# Patient Record
Sex: FEMALE | Race: WHITE | NOT HISPANIC OR LATINO | Employment: OTHER | URBAN - METROPOLITAN AREA
[De-identification: names, ages, dates, MRNs, and addresses within clinical notes are randomized per-mention and may not be internally consistent; named-entity substitution may affect disease eponyms.]

---

## 2023-04-28 LAB
C REACTIVE PROTEIN (MG/L) IN SER/PLAS: 0.13 MG/DL
SEDIMENTATION RATE, ERYTHROCYTE: 14 MM/H (ref 0–30)

## 2023-05-08 DIAGNOSIS — E78.5 HYPERLIPIDEMIA, UNSPECIFIED: ICD-10-CM

## 2023-05-08 DIAGNOSIS — K21.9 GASTRO-ESOPHAGEAL REFLUX DISEASE WITHOUT ESOPHAGITIS: ICD-10-CM

## 2023-05-09 RX ORDER — PANTOPRAZOLE SODIUM 40 MG/1
TABLET, DELAYED RELEASE ORAL
Qty: 30 TABLET | Refills: 0 | Status: SHIPPED | OUTPATIENT
Start: 2023-05-09 | End: 2023-05-16 | Stop reason: SDUPTHER

## 2023-05-09 RX ORDER — SIMVASTATIN 40 MG/1
TABLET, FILM COATED ORAL
Qty: 30 TABLET | Refills: 0 | Status: SHIPPED | OUTPATIENT
Start: 2023-05-09 | End: 2023-05-16 | Stop reason: SDUPTHER

## 2023-05-09 RX ORDER — EZETIMIBE 10 MG/1
TABLET ORAL
Qty: 30 TABLET | Refills: 0 | Status: SHIPPED | OUTPATIENT
Start: 2023-05-09 | End: 2023-05-16 | Stop reason: SDUPTHER

## 2023-05-16 ENCOUNTER — OFFICE VISIT (OUTPATIENT)
Dept: PRIMARY CARE | Facility: CLINIC | Age: 73
End: 2023-05-16
Payer: MEDICARE

## 2023-05-16 VITALS
RESPIRATION RATE: 16 BRPM | HEIGHT: 66 IN | BODY MASS INDEX: 31.34 KG/M2 | WEIGHT: 195 LBS | DIASTOLIC BLOOD PRESSURE: 86 MMHG | SYSTOLIC BLOOD PRESSURE: 130 MMHG | OXYGEN SATURATION: 100 % | HEART RATE: 64 BPM

## 2023-05-16 DIAGNOSIS — K22.719 BARRETT'S ESOPHAGUS WITH DYSPLASIA: ICD-10-CM

## 2023-05-16 DIAGNOSIS — M25.551 RIGHT HIP PAIN: ICD-10-CM

## 2023-05-16 DIAGNOSIS — M54.41 CHRONIC BILATERAL LOW BACK PAIN WITH RIGHT-SIDED SCIATICA: ICD-10-CM

## 2023-05-16 DIAGNOSIS — F33.41 RECURRENT MAJOR DEPRESSIVE DISORDER, IN PARTIAL REMISSION (CMS-HCC): ICD-10-CM

## 2023-05-16 DIAGNOSIS — M54.41 CHRONIC BILATERAL LOW BACK PAIN WITH RIGHT-SIDED SCIATICA: Primary | ICD-10-CM

## 2023-05-16 DIAGNOSIS — N32.81 OAB (OVERACTIVE BLADDER): ICD-10-CM

## 2023-05-16 DIAGNOSIS — K21.9 CHRONIC GERD: ICD-10-CM

## 2023-05-16 DIAGNOSIS — G89.29 CHRONIC BILATERAL LOW BACK PAIN WITH RIGHT-SIDED SCIATICA: ICD-10-CM

## 2023-05-16 DIAGNOSIS — E66.09 CLASS 1 OBESITY DUE TO EXCESS CALORIES WITHOUT SERIOUS COMORBIDITY WITH BODY MASS INDEX (BMI) OF 31.0 TO 31.9 IN ADULT: ICD-10-CM

## 2023-05-16 DIAGNOSIS — I51.9 DIASTOLIC DYSFUNCTION, LEFT VENTRICLE: Primary | ICD-10-CM

## 2023-05-16 DIAGNOSIS — E78.5 DYSLIPIDEMIA: ICD-10-CM

## 2023-05-16 DIAGNOSIS — K21.9 GASTRO-ESOPHAGEAL REFLUX DISEASE WITHOUT ESOPHAGITIS: ICD-10-CM

## 2023-05-16 DIAGNOSIS — E78.5 HYPERLIPIDEMIA, UNSPECIFIED: ICD-10-CM

## 2023-05-16 DIAGNOSIS — Z86.718 HX OF DEEP VENOUS THROMBOSIS: ICD-10-CM

## 2023-05-16 DIAGNOSIS — G89.29 CHRONIC BILATERAL LOW BACK PAIN WITH RIGHT-SIDED SCIATICA: Primary | ICD-10-CM

## 2023-05-16 PROBLEM — H02.831 DERMATOCHALASIS OF RIGHT UPPER EYELID: Status: ACTIVE | Noted: 2023-05-16

## 2023-05-16 PROBLEM — K44.9 HIATAL HERNIA: Status: ACTIVE | Noted: 2023-05-16

## 2023-05-16 PROBLEM — N95.0 PMB (POSTMENOPAUSAL BLEEDING): Status: ACTIVE | Noted: 2023-05-16

## 2023-05-16 PROBLEM — G47.00 INSOMNIA: Status: ACTIVE | Noted: 2023-05-16

## 2023-05-16 PROBLEM — H57.813 BROW PTOSIS, BILATERAL: Status: ACTIVE | Noted: 2023-05-16

## 2023-05-16 PROBLEM — H52.4 ASTIGMATISM OF BOTH EYES WITH PRESBYOPIA: Status: ACTIVE | Noted: 2023-05-16

## 2023-05-16 PROBLEM — I83.10 VARICOSE VEINS WITH INFLAMMATION: Status: ACTIVE | Noted: 2023-05-16

## 2023-05-16 PROBLEM — H25.813 COMBINED FORM OF AGE-RELATED CATARACT, BOTH EYES: Status: ACTIVE | Noted: 2023-05-16

## 2023-05-16 PROBLEM — H25.13 CATARACT, NUCLEAR SCLEROTIC, BOTH EYES: Status: ACTIVE | Noted: 2023-05-16

## 2023-05-16 PROBLEM — R92.8 ABNORMAL MAMMOGRAM: Status: ACTIVE | Noted: 2023-05-16

## 2023-05-16 PROBLEM — H02.403 PTOSIS OF BOTH EYELIDS: Status: ACTIVE | Noted: 2023-05-16

## 2023-05-16 PROBLEM — H02.839 DERMATOCHALASIS: Status: ACTIVE | Noted: 2023-05-16

## 2023-05-16 PROBLEM — R73.03 PREDIABETES: Status: RESOLVED | Noted: 2023-05-16 | Resolved: 2023-05-16

## 2023-05-16 PROBLEM — R10.31 RIGHT INGUINAL PAIN: Status: ACTIVE | Noted: 2023-05-16

## 2023-05-16 PROBLEM — H53.40 VISUAL FIELD DEFECT: Status: ACTIVE | Noted: 2023-05-16

## 2023-05-16 PROBLEM — M17.9 OSTEOARTHRITIS OF KNEE: Status: ACTIVE | Noted: 2023-05-16

## 2023-05-16 PROBLEM — N18.31 STAGE 3A CHRONIC KIDNEY DISEASE (MULTI): Status: ACTIVE | Noted: 2023-05-16

## 2023-05-16 PROBLEM — N18.31 STAGE 3A CHRONIC KIDNEY DISEASE (MULTI): Status: RESOLVED | Noted: 2023-05-16 | Resolved: 2023-05-16

## 2023-05-16 PROBLEM — K12.2 UVULITIS: Status: ACTIVE | Noted: 2023-05-16

## 2023-05-16 PROBLEM — H02.839 DERMATOCHALASIS: Status: RESOLVED | Noted: 2023-05-16 | Resolved: 2023-05-16

## 2023-05-16 PROBLEM — E66.9 OBESITY (BMI 30-39.9): Status: ACTIVE | Noted: 2023-05-16

## 2023-05-16 PROBLEM — H02.834 DERMATOCHALASIS OF LEFT UPPER EYELID: Status: ACTIVE | Noted: 2023-05-16

## 2023-05-16 PROBLEM — I82.409 DEEP VEIN THROMBOSIS (MULTI): Status: ACTIVE | Noted: 2023-05-16

## 2023-05-16 PROBLEM — M54.31 SCIATICA OF RIGHT SIDE: Status: ACTIVE | Noted: 2023-05-16

## 2023-05-16 PROBLEM — R06.00 DYSPNEA: Status: ACTIVE | Noted: 2023-05-16

## 2023-05-16 PROBLEM — S76.019A STRAIN OF HIP FLEXOR: Status: ACTIVE | Noted: 2023-05-16

## 2023-05-16 PROBLEM — J02.9 SORE THROAT: Status: ACTIVE | Noted: 2023-05-16

## 2023-05-16 PROBLEM — J31.0 RHINITIS: Status: ACTIVE | Noted: 2023-05-16

## 2023-05-16 PROBLEM — M54.50 LOW BACK PAIN: Status: ACTIVE | Noted: 2023-05-16

## 2023-05-16 PROBLEM — H25.12 AGE-RELATED NUCLEAR CATARACT, LEFT: Status: ACTIVE | Noted: 2023-05-16

## 2023-05-16 PROBLEM — J02.9 SORE THROAT: Status: RESOLVED | Noted: 2023-05-16 | Resolved: 2023-05-16

## 2023-05-16 PROBLEM — N39.0 ACUTE UTI: Status: RESOLVED | Noted: 2023-05-16 | Resolved: 2023-05-16

## 2023-05-16 PROBLEM — H52.203 ASTIGMATISM OF BOTH EYES WITH PRESBYOPIA: Status: ACTIVE | Noted: 2023-05-16

## 2023-05-16 PROBLEM — S76.019A STRAIN OF HIP FLEXOR: Status: RESOLVED | Noted: 2023-05-16 | Resolved: 2023-05-16

## 2023-05-16 PROBLEM — R10.13 DYSPEPSIA: Status: ACTIVE | Noted: 2023-05-16

## 2023-05-16 PROBLEM — N39.0 ACUTE UTI: Status: ACTIVE | Noted: 2023-05-16

## 2023-05-16 PROBLEM — M25.562 BILATERAL KNEE PAIN: Status: ACTIVE | Noted: 2023-05-16

## 2023-05-16 PROBLEM — H02.89 STEATOBLEPHARON: Status: ACTIVE | Noted: 2023-05-16

## 2023-05-16 PROBLEM — R73.03 PREDIABETES: Status: ACTIVE | Noted: 2023-05-16

## 2023-05-16 PROBLEM — M25.561 BILATERAL KNEE PAIN: Status: ACTIVE | Noted: 2023-05-16

## 2023-05-16 PROBLEM — K22.70 BARRETT'S ESOPHAGUS: Status: ACTIVE | Noted: 2023-05-16

## 2023-05-16 PROCEDURE — 99214 OFFICE O/P EST MOD 30 MIN: CPT | Performed by: FAMILY MEDICINE

## 2023-05-16 PROCEDURE — 1036F TOBACCO NON-USER: CPT | Performed by: FAMILY MEDICINE

## 2023-05-16 PROCEDURE — 1159F MED LIST DOCD IN RCRD: CPT | Performed by: FAMILY MEDICINE

## 2023-05-16 PROCEDURE — 3008F BODY MASS INDEX DOCD: CPT | Performed by: FAMILY MEDICINE

## 2023-05-16 RX ORDER — ENOXAPARIN SODIUM 100 MG/ML
INJECTION SUBCUTANEOUS
COMMUNITY
Start: 2022-11-17 | End: 2023-05-16 | Stop reason: ALTCHOICE

## 2023-05-16 RX ORDER — ENOXAPARIN SODIUM 100 MG/ML
INJECTION SUBCUTANEOUS
COMMUNITY
Start: 2022-11-10 | End: 2023-05-16 | Stop reason: ALTCHOICE

## 2023-05-16 RX ORDER — OXYBUTYNIN CHLORIDE 10 MG/1
10 TABLET, EXTENDED RELEASE ORAL DAILY
Qty: 90 TABLET | Refills: 1 | Status: SHIPPED | OUTPATIENT
Start: 2023-05-16 | End: 2023-11-14 | Stop reason: SDUPTHER

## 2023-05-16 RX ORDER — APIXABAN 5 MG/1
5 TABLET, FILM COATED ORAL 2 TIMES DAILY
Qty: 180 TABLET | Refills: 1 | Status: SHIPPED | OUTPATIENT
Start: 2023-05-16 | End: 2023-06-12

## 2023-05-16 RX ORDER — BUPROPION HYDROCHLORIDE 150 MG/1
150 TABLET ORAL DAILY
Qty: 90 TABLET | Refills: 1 | Status: SHIPPED | OUTPATIENT
Start: 2023-05-16 | End: 2023-06-12

## 2023-05-16 RX ORDER — APIXABAN 5 MG/1
TABLET, FILM COATED ORAL
COMMUNITY
Start: 2022-11-18 | End: 2023-05-16 | Stop reason: SDUPTHER

## 2023-05-16 RX ORDER — PANTOPRAZOLE SODIUM 40 MG/1
40 TABLET, DELAYED RELEASE ORAL DAILY PRN
Qty: 90 TABLET | Refills: 1 | Status: SHIPPED | OUTPATIENT
Start: 2023-05-16 | End: 2023-11-14 | Stop reason: SDUPTHER

## 2023-05-16 RX ORDER — BACLOFEN 10 MG/1
1 TABLET ORAL 3 TIMES DAILY PRN
COMMUNITY
Start: 2021-08-23 | End: 2023-05-16 | Stop reason: SDUPTHER

## 2023-05-16 RX ORDER — EZETIMIBE 10 MG/1
10 TABLET ORAL DAILY
Qty: 90 TABLET | Refills: 1 | Status: SHIPPED | OUTPATIENT
Start: 2023-05-16 | End: 2023-11-14 | Stop reason: SDUPTHER

## 2023-05-16 RX ORDER — ACETAMINOPHEN 325 MG/1
TABLET ORAL
COMMUNITY
Start: 2022-11-17

## 2023-05-16 RX ORDER — BACLOFEN 10 MG/1
10 TABLET ORAL 3 TIMES DAILY PRN
Qty: 60 TABLET | Refills: 2 | Status: SHIPPED | OUTPATIENT
Start: 2023-05-16 | End: 2023-10-30 | Stop reason: ALTCHOICE

## 2023-05-16 RX ORDER — OXYBUTYNIN CHLORIDE 10 MG/1
1 TABLET, EXTENDED RELEASE ORAL DAILY
COMMUNITY
Start: 2016-01-15 | End: 2023-05-16 | Stop reason: SDUPTHER

## 2023-05-16 RX ORDER — BUPROPION HYDROCHLORIDE 150 MG/1
1 TABLET ORAL DAILY
COMMUNITY
Start: 2017-04-03 | End: 2023-05-16 | Stop reason: SDUPTHER

## 2023-05-16 RX ORDER — SIMVASTATIN 40 MG/1
40 TABLET, FILM COATED ORAL NIGHTLY
Qty: 90 TABLET | Refills: 1 | Status: SHIPPED | OUTPATIENT
Start: 2023-05-16 | End: 2023-11-14 | Stop reason: SDUPTHER

## 2023-05-16 RX ORDER — HYDROXYZINE HYDROCHLORIDE 50 MG/1
TABLET, FILM COATED ORAL
COMMUNITY
Start: 2022-11-29 | End: 2023-10-30 | Stop reason: ALTCHOICE

## 2023-05-16 ASSESSMENT — ENCOUNTER SYMPTOMS
HEADACHES: 0
SINUS PRESSURE: 0
FATIGUE: 0
LIGHT-HEADEDNESS: 0
NAUSEA: 0
WHEEZING: 0
CHILLS: 0
DEPRESSION: 0
FEVER: 0
CHEST TIGHTNESS: 0
SHORTNESS OF BREATH: 0
DYSURIA: 0
LOSS OF SENSATION IN FEET: 0
ARTHRALGIAS: 1
DIARRHEA: 0
ABDOMINAL DISTENTION: 0
DIFFICULTY URINATING: 0
BRUISES/BLEEDS EASILY: 0
APPETITE CHANGE: 0
ADENOPATHY: 0
ABDOMINAL PAIN: 0
BACK PAIN: 1
DYSPHORIC MOOD: 0
OCCASIONAL FEELINGS OF UNSTEADINESS: 1
SLEEP DISTURBANCE: 0
MYALGIAS: 0
NERVOUS/ANXIOUS: 0

## 2023-05-16 NOTE — PROGRESS NOTES
"Subjective   Patient ID: Zara Motta is a 72 y.o. female who presents for Follow-up.    HPI     Review of Systems    Objective   /86   Pulse 64   Resp 16   Ht 1.676 m (5' 6\")   Wt 88.5 kg (195 lb)   SpO2 100%   BMI 31.47 kg/m²     Physical Exam    Assessment/Plan          "

## 2023-05-16 NOTE — PROGRESS NOTES
Subjective   Patient ID: Zara Motta is a 72 y.o. female who presents for Follow-up.  Seeing pain mngt  and to have MRI for chronic R hip, post MINERVA in November, and chronic lower back pain. It radiates down R inner thigh. PT hasn't helped. Also had spinal injx about 2 months ago. Taking Tylenol and Baclofen as needed.    Pt has Dyslipidemia.   Lipid panel showed LDL in good range..  Currently taking Zetia, and Simvastatin  and is tolerating well without muscle pains or weakness.     For OAB  taking Oxybutynin . Doing well and tolerating medication well. Frequency is not bothersome.     Pt has chronic and stable depression / anxiety.  Pt has supportive family/friends.   Pt not seeing a counselor.   Taking Bupropion and it is helping.   Mood, energy, motivation, interests, sleep and appetite are adequate. Anxiety is stable.  Pt denies suicidal ideations.     GERD/Barretts  is well controlled on Pantoprazole . EGD current from 2021. Pt is taking medication once daily. Denies epigastric pain, nausea, heartburn or water brash.     For hx DVT doing well on Eliquis, changed from Warfarin back in November after hip replacement.         Review of Systems   Constitutional:  Negative for appetite change, chills, fatigue and fever.   HENT:  Negative for congestion, ear pain and sinus pressure.    Eyes:  Negative for visual disturbance.   Respiratory:  Negative for chest tightness, shortness of breath and wheezing.    Cardiovascular:  Negative for chest pain.   Gastrointestinal:  Negative for abdominal distention, abdominal pain, diarrhea and nausea.   Genitourinary:  Negative for difficulty urinating, dysuria and pelvic pain.   Musculoskeletal:  Positive for arthralgias and back pain. Negative for myalgias.   Skin:  Negative for rash.   Allergic/Immunologic: Negative for immunocompromised state.   Neurological:  Negative for light-headedness and headaches.   Hematological:  Negative for adenopathy. Does not bruise/bleed  "easily.   Psychiatric/Behavioral:  Negative for dysphoric mood and sleep disturbance. The patient is not nervous/anxious.        Objective   /86   Pulse 64   Resp 16   Ht 1.676 m (5' 6\")   Wt 88.5 kg (195 lb)   SpO2 100%   BMI 31.47 kg/m²    Physical Exam  Constitutional:       General: She is not in acute distress.     Appearance: Normal appearance.   Cardiovascular:      Rate and Rhythm: Normal rate and regular rhythm.      Heart sounds: Normal heart sounds. No murmur heard.  Pulmonary:      Effort: Pulmonary effort is normal.      Breath sounds: Normal breath sounds.   Abdominal:      Palpations: Abdomen is soft.      Tenderness: There is no abdominal tenderness.   Neurological:      Mental Status: She is alert.   Psychiatric:         Mood and Affect: Mood normal.         Judgment: Judgment normal.           Assessment/Plan   Diagnoses and all orders for this visit:  Hyperlipidemia, unspecified - doing well on Zetia/statin, continue and monitor  Gastro-esophageal reflux disease / Barretts - doing well on Pantoprazole, EGD is current  OAB (overactive bladder) - stable with Oxybutynin, continue  Right hip pain / Back pain - continue with plan for MRI, per pain mgt/ortho  Recurrent major depressive disorder, in partial remission (CMS/HCC) - stable, continue Bupropion and monitor.  Weight - recommend low carb diet, increasing water intake to at least 64oz/day, healthy snacking between meals, and regular cardiovascular exercise 150mins/week. Goal for weight loss is 1-2# per week.   Follow up in 6 months, 30min       "

## 2023-06-01 ENCOUNTER — TELEPHONE (OUTPATIENT)
Dept: PRIMARY CARE | Facility: CLINIC | Age: 73
End: 2023-06-01

## 2023-06-01 NOTE — TELEPHONE ENCOUNTER
----- Message from Ha Damian MD sent at 6/1/2023  1:36 PM EDT -----  MRI result sent to me in error. Please make sure radiology sends the report to the ordering physician.  ----- Message -----  From: [facility] - Radiology Results In  Sent: 6/1/2023   9:51 AM EDT  To: Ha Damian MD

## 2023-06-01 NOTE — TELEPHONE ENCOUNTER
[facility] states there was no alert for the results. Gave me [phone] for [place] [facility]. [facility] to ensure results sent to correct ordering physician.

## 2023-06-10 DIAGNOSIS — F33.41 RECURRENT MAJOR DEPRESSIVE DISORDER, IN PARTIAL REMISSION (CMS-HCC): ICD-10-CM

## 2023-06-11 DIAGNOSIS — Z86.718 HX OF DEEP VENOUS THROMBOSIS: ICD-10-CM

## 2023-06-12 RX ORDER — BUPROPION HYDROCHLORIDE 150 MG/1
TABLET ORAL
Qty: 90 TABLET | Refills: 1 | Status: SHIPPED | OUTPATIENT
Start: 2023-06-12 | End: 2023-11-14 | Stop reason: SDUPTHER

## 2023-06-12 RX ORDER — APIXABAN 5 MG/1
TABLET, FILM COATED ORAL
Qty: 180 TABLET | Refills: 1 | Status: SHIPPED | OUTPATIENT
Start: 2023-06-12 | End: 2023-11-08 | Stop reason: SDUPTHER

## 2023-06-26 DIAGNOSIS — I82.409 ACUTE EMBOLISM AND THROMBOSIS OF UNSPECIFIED DEEP VEINS OF UNSPECIFIED LOWER EXTREMITY (MULTI): ICD-10-CM

## 2023-06-26 RX ORDER — WARFARIN 6 MG/1
TABLET ORAL
Qty: 90 TABLET | Refills: 1 | Status: SHIPPED | OUTPATIENT
Start: 2023-06-26 | End: 2023-08-29 | Stop reason: ALTCHOICE

## 2023-06-27 ENCOUNTER — TELEPHONE (OUTPATIENT)
Dept: PRIMARY CARE | Facility: CLINIC | Age: 73
End: 2023-06-27

## 2023-06-27 NOTE — TELEPHONE ENCOUNTER
----- Message from Ha Damian MD sent at 6/27/2023  1:00 PM EDT -----  Bone density test is normal. Recommend regular weight bearing exercise and calcium rich diet for prevention of osteoporosis.   ----- Message -----  From: [facility] - Radiology Results In  Sent: 6/27/2023  11:06 AM EDT  To: Ha Damian MD

## 2023-08-28 ENCOUNTER — LAB (OUTPATIENT)
Dept: LAB | Facility: LAB | Age: 73
End: 2023-08-28
Payer: MEDICARE

## 2023-08-28 ENCOUNTER — TELEPHONE (OUTPATIENT)
Dept: PRIMARY CARE | Facility: CLINIC | Age: 73
End: 2023-08-28

## 2023-08-28 DIAGNOSIS — R30.0 DYSURIA: ICD-10-CM

## 2023-08-28 DIAGNOSIS — R30.0 DYSURIA: Primary | ICD-10-CM

## 2023-08-28 DIAGNOSIS — N30.00 ACUTE CYSTITIS WITHOUT HEMATURIA: ICD-10-CM

## 2023-08-28 PROCEDURE — 81001 URINALYSIS AUTO W/SCOPE: CPT

## 2023-08-28 NOTE — TELEPHONE ENCOUNTER
LVM for pt to contact office to schedule pre-op clearance to [facility]. PPW in to do folder.

## 2023-08-28 NOTE — TELEPHONE ENCOUNTER
PATIENT CALLED IN AND FEELS SHE MAY HAVE A UTI CAN YOU FIT IN TODAY OR PLACE AN ORDER FOR A SAMPLE TO BE LEFT AT THE LAB

## 2023-08-29 ENCOUNTER — TELEPHONE (OUTPATIENT)
Dept: PRIMARY CARE | Facility: CLINIC | Age: 73
End: 2023-08-29

## 2023-08-29 LAB
APPEARANCE, URINE: ABNORMAL
BACTERIA, URINE: ABNORMAL /HPF
BILIRUBIN, URINE: NEGATIVE
BLOOD, URINE: NEGATIVE
CALCIUM OXALATE CRYSTALS, URINE: ABNORMAL /HPF
COLOR, URINE: YELLOW
GLUCOSE, URINE: NEGATIVE MG/DL
KETONES, URINE: NEGATIVE MG/DL
LEUKOCYTE ESTERASE, URINE: ABNORMAL
MUCUS, URINE: ABNORMAL /LPF
NITRITE, URINE: NEGATIVE
PH, URINE: 5 (ref 5–8)
PROTEIN, URINE: NEGATIVE MG/DL
RBC, URINE: 4 /HPF (ref 0–5)
SPECIFIC GRAVITY, URINE: 1.03 (ref 1–1.03)
SQUAMOUS EPITHELIAL CELLS, URINE: 1 /HPF
UROBILINOGEN, URINE: <2 MG/DL (ref 0–1.9)
WBC CLUMPS, URINE: ABNORMAL /HPF
WBC, URINE: 51 /HPF (ref 0–5)
YEAST HYPHAE, URINE: PRESENT /HPF

## 2023-08-29 RX ORDER — FLUCONAZOLE 150 MG/1
TABLET ORAL
Qty: 2 TABLET | Refills: 0 | Status: SHIPPED | OUTPATIENT
Start: 2023-08-29 | End: 2023-10-30 | Stop reason: ALTCHOICE

## 2023-08-29 RX ORDER — CIPROFLOXACIN 500 MG/1
500 TABLET ORAL 2 TIMES DAILY
Qty: 14 TABLET | Refills: 0 | Status: SHIPPED | OUTPATIENT
Start: 2023-08-29 | End: 2023-09-05

## 2023-09-02 DIAGNOSIS — F33.41 RECURRENT MAJOR DEPRESSIVE DISORDER, IN PARTIAL REMISSION (CMS-HCC): ICD-10-CM

## 2023-09-27 RX ORDER — BUPROPION HYDROCHLORIDE 150 MG/1
150 TABLET ORAL DAILY
Qty: 90 TABLET | Refills: 1 | OUTPATIENT
Start: 2023-09-27

## 2023-10-30 ENCOUNTER — LAB (OUTPATIENT)
Dept: LAB | Facility: LAB | Age: 73
End: 2023-10-30
Payer: MEDICARE

## 2023-10-30 ENCOUNTER — OFFICE VISIT (OUTPATIENT)
Dept: PRIMARY CARE | Facility: CLINIC | Age: 73
End: 2023-10-30
Payer: MEDICARE

## 2023-10-30 VITALS
OXYGEN SATURATION: 92 % | HEART RATE: 64 BPM | BODY MASS INDEX: 33.27 KG/M2 | DIASTOLIC BLOOD PRESSURE: 82 MMHG | WEIGHT: 207 LBS | HEIGHT: 66 IN | SYSTOLIC BLOOD PRESSURE: 124 MMHG | RESPIRATION RATE: 12 BRPM

## 2023-10-30 DIAGNOSIS — E66.09 CLASS 1 OBESITY DUE TO EXCESS CALORIES WITHOUT SERIOUS COMORBIDITY WITH BODY MASS INDEX (BMI) OF 33.0 TO 33.9 IN ADULT: ICD-10-CM

## 2023-10-30 DIAGNOSIS — H02.403 PTOSIS OF BOTH EYELIDS: ICD-10-CM

## 2023-10-30 DIAGNOSIS — Z01.818 PRE-OP EVALUATION: Primary | ICD-10-CM

## 2023-10-30 DIAGNOSIS — F33.41 RECURRENT MAJOR DEPRESSIVE DISORDER, IN PARTIAL REMISSION (CMS-HCC): ICD-10-CM

## 2023-10-30 DIAGNOSIS — N32.81 OAB (OVERACTIVE BLADDER): ICD-10-CM

## 2023-10-30 DIAGNOSIS — E78.5 DYSLIPIDEMIA: ICD-10-CM

## 2023-10-30 DIAGNOSIS — I82.4Y9 DEEP VEIN THROMBOSIS (DVT) OF PROXIMAL LOWER EXTREMITY, UNSPECIFIED CHRONICITY, UNSPECIFIED LATERALITY (MULTI): ICD-10-CM

## 2023-10-30 DIAGNOSIS — Z01.818 PRE-OP EVALUATION: ICD-10-CM

## 2023-10-30 DIAGNOSIS — K22.719 BARRETT'S ESOPHAGUS WITH DYSPLASIA: ICD-10-CM

## 2023-10-30 PROBLEM — E66.811 CLASS 1 OBESITY DUE TO EXCESS CALORIES WITHOUT SERIOUS COMORBIDITY WITH BODY MASS INDEX (BMI) OF 33.0 TO 33.9 IN ADULT: Status: ACTIVE | Noted: 2023-10-30

## 2023-10-30 LAB
ALBUMIN SERPL BCP-MCNC: 4.2 G/DL (ref 3.4–5)
ALP SERPL-CCNC: 65 U/L (ref 33–136)
ALT SERPL W P-5'-P-CCNC: 16 U/L (ref 7–45)
ANION GAP SERPL CALC-SCNC: 12 MMOL/L (ref 10–20)
APPEARANCE UR: CLEAR
AST SERPL W P-5'-P-CCNC: 17 U/L (ref 9–39)
BILIRUB SERPL-MCNC: 0.7 MG/DL (ref 0–1.2)
BILIRUB UR STRIP.AUTO-MCNC: NEGATIVE MG/DL
BUN SERPL-MCNC: 20 MG/DL (ref 6–23)
CALCIUM SERPL-MCNC: 9.7 MG/DL (ref 8.6–10.6)
CHLORIDE SERPL-SCNC: 105 MMOL/L (ref 98–107)
CHOLEST SERPL-MCNC: 179 MG/DL (ref 0–199)
CHOLESTEROL/HDL RATIO: 2.2
CO2 SERPL-SCNC: 30 MMOL/L (ref 21–32)
COLOR UR: ABNORMAL
CREAT SERPL-MCNC: 0.85 MG/DL (ref 0.5–1.05)
ERYTHROCYTE [DISTWIDTH] IN BLOOD BY AUTOMATED COUNT: 13.5 % (ref 11.5–14.5)
GFR SERPL CREATININE-BSD FRML MDRD: 72 ML/MIN/1.73M*2
GLUCOSE SERPL-MCNC: 83 MG/DL (ref 74–99)
GLUCOSE UR STRIP.AUTO-MCNC: NEGATIVE MG/DL
HCT VFR BLD AUTO: 35.9 % (ref 36–46)
HDLC SERPL-MCNC: 80.7 MG/DL
HGB BLD-MCNC: 11.1 G/DL (ref 12–16)
KETONES UR STRIP.AUTO-MCNC: NEGATIVE MG/DL
LDLC SERPL CALC-MCNC: 87 MG/DL
LEUKOCYTE ESTERASE UR QL STRIP.AUTO: ABNORMAL
MCH RBC QN AUTO: 28.2 PG (ref 26–34)
MCHC RBC AUTO-ENTMCNC: 30.9 G/DL (ref 32–36)
MCV RBC AUTO: 91 FL (ref 80–100)
NITRITE UR QL STRIP.AUTO: NEGATIVE
NON HDL CHOLESTEROL: 98 MG/DL (ref 0–149)
NRBC BLD-RTO: 0 /100 WBCS (ref 0–0)
PH UR STRIP.AUTO: 6 [PH]
PLATELET # BLD AUTO: 217 X10*3/UL (ref 150–450)
PMV BLD AUTO: 10.5 FL (ref 7.5–11.5)
POC APPEARANCE, URINE: CLEAR
POC BILIRUBIN, URINE: NEGATIVE
POC BLOOD, URINE: ABNORMAL
POC COLOR, URINE: YELLOW
POC GLUCOSE, URINE: NEGATIVE MG/DL
POC KETONES, URINE: NEGATIVE MG/DL
POC LEUKOCYTES, URINE: ABNORMAL
POC NITRITE,URINE: NEGATIVE
POC PH, URINE: 6 PH
POC PROTEIN, URINE: NEGATIVE MG/DL
POC SPECIFIC GRAVITY, URINE: 1.01
POC UROBILINOGEN, URINE: 0.2 EU/DL
POTASSIUM SERPL-SCNC: 4.2 MMOL/L (ref 3.5–5.3)
PROT SERPL-MCNC: 6.5 G/DL (ref 6.4–8.2)
PROT UR STRIP.AUTO-MCNC: NEGATIVE MG/DL
RBC # BLD AUTO: 3.94 X10*6/UL (ref 4–5.2)
RBC # UR STRIP.AUTO: NEGATIVE /UL
RBC #/AREA URNS AUTO: NORMAL /HPF
SODIUM SERPL-SCNC: 143 MMOL/L (ref 136–145)
SP GR UR STRIP.AUTO: 1
TRIGL SERPL-MCNC: 59 MG/DL (ref 0–149)
UROBILINOGEN UR STRIP.AUTO-MCNC: <2 MG/DL
VLDL: 12 MG/DL (ref 0–40)
WBC # BLD AUTO: 5 X10*3/UL (ref 4.4–11.3)
WBC #/AREA URNS AUTO: NORMAL /HPF

## 2023-10-30 PROCEDURE — 93000 ELECTROCARDIOGRAM COMPLETE: CPT | Performed by: FAMILY MEDICINE

## 2023-10-30 PROCEDURE — 81001 URINALYSIS AUTO W/SCOPE: CPT

## 2023-10-30 PROCEDURE — 85027 COMPLETE CBC AUTOMATED: CPT

## 2023-10-30 PROCEDURE — 80061 LIPID PANEL: CPT

## 2023-10-30 PROCEDURE — 36415 COLL VENOUS BLD VENIPUNCTURE: CPT

## 2023-10-30 PROCEDURE — 80053 COMPREHEN METABOLIC PANEL: CPT

## 2023-10-30 PROCEDURE — 1126F AMNT PAIN NOTED NONE PRSNT: CPT | Performed by: FAMILY MEDICINE

## 2023-10-30 PROCEDURE — 1036F TOBACCO NON-USER: CPT | Performed by: FAMILY MEDICINE

## 2023-10-30 PROCEDURE — 3008F BODY MASS INDEX DOCD: CPT | Performed by: FAMILY MEDICINE

## 2023-10-30 PROCEDURE — 99214 OFFICE O/P EST MOD 30 MIN: CPT | Performed by: FAMILY MEDICINE

## 2023-10-30 PROCEDURE — 1159F MED LIST DOCD IN RCRD: CPT | Performed by: FAMILY MEDICINE

## 2023-10-30 PROCEDURE — 81003 URINALYSIS AUTO W/O SCOPE: CPT | Performed by: FAMILY MEDICINE

## 2023-10-30 RX ORDER — DULOXETIN HYDROCHLORIDE 30 MG/1
CAPSULE, DELAYED RELEASE ORAL
COMMUNITY
Start: 2023-10-23 | End: 2024-02-26 | Stop reason: WASHOUT

## 2023-10-30 RX ORDER — TRAZODONE HYDROCHLORIDE 100 MG/1
100 TABLET ORAL NIGHTLY PRN
COMMUNITY
Start: 2015-04-17 | End: 2023-11-14 | Stop reason: SDUPTHER

## 2023-10-30 ASSESSMENT — ENCOUNTER SYMPTOMS
SINUS PRESSURE: 0
ADENOPATHY: 0
ABDOMINAL DISTENTION: 0
WHEEZING: 0
CHEST TIGHTNESS: 0
DIARRHEA: 0
MYALGIAS: 0
CHILLS: 0
LIGHT-HEADEDNESS: 0
BRUISES/BLEEDS EASILY: 0
DIFFICULTY URINATING: 0
DYSPHORIC MOOD: 0
SHORTNESS OF BREATH: 0
ABDOMINAL PAIN: 0
HEADACHES: 0
DYSURIA: 0
NERVOUS/ANXIOUS: 0
SLEEP DISTURBANCE: 0
APPETITE CHANGE: 0
ARTHRALGIAS: 0
NAUSEA: 0
FEVER: 0
FATIGUE: 0

## 2023-10-30 NOTE — PROGRESS NOTES
"Subjective   Patient ID: Zara Motta is a 73 y.o. female who presents for Pre-op Exam.    HPI     Review of Systems    Objective   /82   Pulse 64   Resp 12   Ht 1.676 m (5' 6\")   Wt 93.9 kg (207 lb)   SpO2 92%   BMI 33.41 kg/m²     Physical Exam    Assessment/Plan          "

## 2023-10-30 NOTE — PROGRESS NOTES
"Subjective   Patient ID: Zara Motta is a 73 y.o. female who presents for Pre-op Exam.  Pt here for preop visit for eyelid surgery planned this Friday with Dr Case. Pt is feeling fine today. She has hx B/l MINERVA no issues with anesthesia. She has hx DLD on Zetia and Simvastatin, OAB on Oxybutynin, and Depression on Wellbutrin. She is on Eliquis for hx DVT and pantoprazole with hx Barretts. Eliquis is expensive in donut hole, would like to see if there is cheaper option.          Review of Systems   Constitutional:  Negative for appetite change, chills, fatigue and fever.   HENT:  Negative for congestion, ear pain and sinus pressure.    Eyes:  Negative for visual disturbance.   Respiratory:  Negative for chest tightness, shortness of breath and wheezing.    Cardiovascular:  Negative for chest pain.   Gastrointestinal:  Negative for abdominal distention, abdominal pain, diarrhea and nausea.   Genitourinary:  Negative for difficulty urinating, dysuria and pelvic pain.   Musculoskeletal:  Negative for arthralgias and myalgias.   Skin:  Negative for rash.   Allergic/Immunologic: Negative for immunocompromised state.   Neurological:  Negative for light-headedness and headaches.   Hematological:  Negative for adenopathy. Does not bruise/bleed easily.   Psychiatric/Behavioral:  Negative for dysphoric mood and sleep disturbance. The patient is not nervous/anxious.        Objective   /82   Pulse 64   Resp 12   Ht 1.676 m (5' 6\")   Wt 93.9 kg (207 lb)   SpO2 92%   BMI 33.41 kg/m²    Physical Exam  Constitutional:       General: She is not in acute distress.     Appearance: Normal appearance. She is not ill-appearing.   HENT:      Head: Normocephalic and atraumatic.      Right Ear: Tympanic membrane, ear canal and external ear normal.      Left Ear: Tympanic membrane, ear canal and external ear normal.      Nose: Nose normal.      Mouth/Throat:      Mouth: Mucous membranes are moist.      Pharynx: No " oropharyngeal exudate or posterior oropharyngeal erythema.   Eyes:      Extraocular Movements: Extraocular movements intact.      Conjunctiva/sclera: Conjunctivae normal.      Pupils: Pupils are equal, round, and reactive to light.   Neck:      Vascular: No carotid bruit.   Cardiovascular:      Rate and Rhythm: Normal rate and regular rhythm.      Heart sounds: Normal heart sounds. No murmur heard.  Pulmonary:      Breath sounds: Normal breath sounds. No wheezing, rhonchi or rales.   Abdominal:      General: Bowel sounds are normal. There is no distension.      Palpations: Abdomen is soft. There is no mass.      Tenderness: There is no abdominal tenderness.   Musculoskeletal:         General: No swelling or deformity.      Cervical back: Neck supple. No tenderness.   Lymphadenopathy:      Cervical: No cervical adenopathy.   Skin:     General: Skin is warm and dry.      Findings: No lesion or rash.   Neurological:      Mental Status: She is alert and oriented to person, place, and time.      Sensory: No sensory deficit.      Motor: No weakness.      Coordination: Coordination normal.      Deep Tendon Reflexes: Reflexes normal.   Psychiatric:         Mood and Affect: Mood normal.         Behavior: Behavior normal.         Judgment: Judgment normal.           Assessment/Plan   Diagnoses and all orders for this visit:  Pre-op evaluation/ -Ptosis of both eyelids H&P done. EKG ok. Pending labs will be ok for purposed surgery.   Dyslipidemia - stable on statin  Deep vein thrombosis - referring to pharmacy to look into cheaper options for Eliquis  Machuca's esophagus/OAB/Recurrent major depressive disorder, - all stable  Weight - recommend low carb diet, and regular exercise    Follow up here as planned

## 2023-10-31 ENCOUNTER — TELEPHONE (OUTPATIENT)
Dept: PRIMARY CARE | Facility: CLINIC | Age: 73
End: 2023-10-31

## 2023-10-31 NOTE — TELEPHONE ENCOUNTER
----- Message from Ha Damian MD sent at 10/31/2023  7:10 AM EDT -----  Labs show mild anemia, otherwise normal.   ----- Message -----  From: Lab, Background User  Sent: 10/30/2023   8:42 PM EDT  To: Ha Damian MD

## 2023-11-06 DIAGNOSIS — N32.81 OAB (OVERACTIVE BLADDER): ICD-10-CM

## 2023-11-08 ENCOUNTER — TELEMEDICINE (OUTPATIENT)
Dept: PHARMACY | Facility: HOSPITAL | Age: 73
End: 2023-11-08

## 2023-11-08 DIAGNOSIS — I82.4Y9 DEEP VEIN THROMBOSIS (DVT) OF PROXIMAL LOWER EXTREMITY, UNSPECIFIED CHRONICITY, UNSPECIFIED LATERALITY (MULTI): ICD-10-CM

## 2023-11-08 DIAGNOSIS — Z86.718 HX OF DEEP VENOUS THROMBOSIS: ICD-10-CM

## 2023-11-08 NOTE — ASSESSMENT & PLAN NOTE
Continue all medications as prescribed   Prescription for Eliquis 5 m tab by mouth twice daily  sent to [facility] using free voucher and to be delivered   Patient to submit tax forms to me, once received, will submit for  Patient Assistance Program review

## 2023-11-14 ENCOUNTER — PHARMACY VISIT (OUTPATIENT)
Dept: PHARMACY | Facility: CLINIC | Age: 73
End: 2023-11-14
Payer: COMMERCIAL

## 2023-11-14 ENCOUNTER — OFFICE VISIT (OUTPATIENT)
Dept: PRIMARY CARE | Facility: CLINIC | Age: 73
End: 2023-11-14
Payer: MEDICARE

## 2023-11-14 VITALS
SYSTOLIC BLOOD PRESSURE: 108 MMHG | HEART RATE: 74 BPM | OXYGEN SATURATION: 98 % | RESPIRATION RATE: 12 BRPM | WEIGHT: 204 LBS | DIASTOLIC BLOOD PRESSURE: 76 MMHG | BODY MASS INDEX: 32.78 KG/M2 | HEIGHT: 66 IN

## 2023-11-14 DIAGNOSIS — K21.9 GASTRO-ESOPHAGEAL REFLUX DISEASE WITHOUT ESOPHAGITIS: ICD-10-CM

## 2023-11-14 DIAGNOSIS — K22.719 BARRETT'S ESOPHAGUS WITH DYSPLASIA: ICD-10-CM

## 2023-11-14 DIAGNOSIS — E66.09 CLASS 1 OBESITY DUE TO EXCESS CALORIES WITHOUT SERIOUS COMORBIDITY WITH BODY MASS INDEX (BMI) OF 32.0 TO 32.9 IN ADULT: ICD-10-CM

## 2023-11-14 DIAGNOSIS — E78.5 DYSLIPIDEMIA: Primary | ICD-10-CM

## 2023-11-14 DIAGNOSIS — F51.01 PRIMARY INSOMNIA: ICD-10-CM

## 2023-11-14 DIAGNOSIS — Z12.31 SCREENING MAMMOGRAM FOR BREAST CANCER: ICD-10-CM

## 2023-11-14 DIAGNOSIS — K21.9 CHRONIC GERD: ICD-10-CM

## 2023-11-14 DIAGNOSIS — F33.41 RECURRENT MAJOR DEPRESSIVE DISORDER, IN PARTIAL REMISSION (CMS-HCC): ICD-10-CM

## 2023-11-14 DIAGNOSIS — E78.5 HYPERLIPIDEMIA, UNSPECIFIED: ICD-10-CM

## 2023-11-14 DIAGNOSIS — N32.81 OAB (OVERACTIVE BLADDER): ICD-10-CM

## 2023-11-14 PROBLEM — E66.811 CLASS 1 OBESITY DUE TO EXCESS CALORIES WITHOUT SERIOUS COMORBIDITY WITH BODY MASS INDEX (BMI) OF 32.0 TO 32.9 IN ADULT: Status: ACTIVE | Noted: 2023-11-14

## 2023-11-14 PROCEDURE — 99214 OFFICE O/P EST MOD 30 MIN: CPT | Performed by: FAMILY MEDICINE

## 2023-11-14 PROCEDURE — 1159F MED LIST DOCD IN RCRD: CPT | Performed by: FAMILY MEDICINE

## 2023-11-14 PROCEDURE — 1036F TOBACCO NON-USER: CPT | Performed by: FAMILY MEDICINE

## 2023-11-14 PROCEDURE — 3008F BODY MASS INDEX DOCD: CPT | Performed by: FAMILY MEDICINE

## 2023-11-14 PROCEDURE — 1126F AMNT PAIN NOTED NONE PRSNT: CPT | Performed by: FAMILY MEDICINE

## 2023-11-14 RX ORDER — EZETIMIBE 10 MG/1
10 TABLET ORAL DAILY
Qty: 90 TABLET | Refills: 1 | Status: SHIPPED | OUTPATIENT
Start: 2023-11-14 | End: 2024-05-13 | Stop reason: SDUPTHER

## 2023-11-14 RX ORDER — SIMVASTATIN 40 MG/1
40 TABLET, FILM COATED ORAL NIGHTLY
Qty: 90 TABLET | Refills: 1 | Status: SHIPPED | OUTPATIENT
Start: 2023-11-14 | End: 2024-05-13 | Stop reason: SDUPTHER

## 2023-11-14 RX ORDER — BUPROPION HYDROCHLORIDE 150 MG/1
150 TABLET ORAL DAILY
Qty: 90 TABLET | Refills: 1 | Status: SHIPPED | OUTPATIENT
Start: 2023-11-14 | End: 2024-05-13 | Stop reason: SDUPTHER

## 2023-11-14 RX ORDER — OXYBUTYNIN CHLORIDE 10 MG/1
10 TABLET, EXTENDED RELEASE ORAL DAILY
Qty: 90 TABLET | Refills: 1 | Status: SHIPPED | OUTPATIENT
Start: 2023-11-14 | End: 2024-05-13 | Stop reason: SDUPTHER

## 2023-11-14 RX ORDER — PANTOPRAZOLE SODIUM 40 MG/1
40 TABLET, DELAYED RELEASE ORAL DAILY PRN
Qty: 90 TABLET | Refills: 1 | Status: SHIPPED | OUTPATIENT
Start: 2023-11-14 | End: 2024-05-13 | Stop reason: SDUPTHER

## 2023-11-14 RX ORDER — TRAZODONE HYDROCHLORIDE 100 MG/1
100 TABLET ORAL NIGHTLY PRN
Qty: 90 TABLET | Refills: 1 | Status: SHIPPED | OUTPATIENT
Start: 2023-11-14 | End: 2024-05-13 | Stop reason: SDUPTHER

## 2023-11-14 NOTE — PROGRESS NOTES
"Subjective   Patient ID: Zara Motta is a 73 y.o. female who presents for Follow-up.    HPI     Review of Systems    Objective   /76   Pulse 74   Resp 12   Ht 1.676 m (5' 6\")   Wt 92.5 kg (204 lb)   SpO2 98%   BMI 32.93 kg/m²     Physical Exam    Assessment/Plan          "

## 2023-11-14 NOTE — PROGRESS NOTES
"Subjective   Patient ID: Zara Motta is a 73 y.o. female who presents for Follow-up.  Pt has Dyslipidemia.   Lipid panel showed LDL in good range.  Currently taking Simvastatin, Zetia and is tolerating well without muscle pains or weakness.     For OAB  taking Oxybutynin . Doing well and tolerating medication well. Frequency is not bothersome.     Pt has chronic and stable depression / anxiety and chronic back pain  Pt  has supportive family/friends.   Pt not seeing a counselor.   Taking Cymbalta, Bupropion, Trazodone and it is helping.   Mood, energy, motivation, interests, sleep and appetite are adequate. Anxiety is stable.  Pt denies suicidal ideations.       GERD/Barretts is well controlled on Protonix . Pt is taking medication daily. Denies epigastric pain, nausea, heartburn or water brash.    For hx DVT doing well on Eliquis.             Review of Systems    Objective   /76   Pulse 74   Resp 12   Ht 1.676 m (5' 6\")   Wt 92.5 kg (204 lb)   SpO2 98%   BMI 32.93 kg/m²    Physical Exam      Assessment/Plan   Diagnoses and all orders for this visit:  Dyslipidemia - stable on statin, continue to monitor with labs  Recurrent major depressive disorder, doing well on current meds, continue and monitor.   OAB stable with Oxybutynin , continue and monitor  Gastro-esophageal reflux /Machuca's esophagus - continue Protonix, EGD current  Primary insomnia - doing well on Trazodone, continue  Weight - recommend low carb diet, increasing water intake to at least 64oz/day, healthy snacking between meals, and regular cardiovascular exercise 150mins/week. Goal for weight loss is 1-2# per week. Referring to dietician.    Follow up in 6 months, 30min. Physical due in February     "

## 2023-11-17 ENCOUNTER — SPECIALTY PHARMACY (OUTPATIENT)
Dept: PHARMACY | Facility: CLINIC | Age: 73
End: 2023-11-17

## 2023-11-21 ENCOUNTER — OFFICE VISIT (OUTPATIENT)
Dept: ORTHOPEDIC SURGERY | Facility: CLINIC | Age: 73
End: 2023-11-21
Payer: MEDICARE

## 2023-11-21 ENCOUNTER — ANCILLARY PROCEDURE (OUTPATIENT)
Dept: RADIOLOGY | Facility: CLINIC | Age: 73
End: 2023-11-21
Payer: MEDICARE

## 2023-11-21 VITALS — HEIGHT: 66 IN | WEIGHT: 188 LBS | BODY MASS INDEX: 30.22 KG/M2

## 2023-11-21 DIAGNOSIS — M25.551 RIGHT HIP PAIN: ICD-10-CM

## 2023-11-21 DIAGNOSIS — Z96.641 STATUS POST RIGHT HIP REPLACEMENT: Primary | ICD-10-CM

## 2023-11-21 PROCEDURE — 73502 X-RAY EXAM HIP UNI 2-3 VIEWS: CPT | Mod: RT

## 2023-11-21 PROCEDURE — 99213 OFFICE O/P EST LOW 20 MIN: CPT | Performed by: ORTHOPAEDIC SURGERY

## 2023-11-21 PROCEDURE — 3008F BODY MASS INDEX DOCD: CPT | Performed by: ORTHOPAEDIC SURGERY

## 2023-11-21 PROCEDURE — 1126F AMNT PAIN NOTED NONE PRSNT: CPT | Performed by: ORTHOPAEDIC SURGERY

## 2023-11-21 PROCEDURE — 1036F TOBACCO NON-USER: CPT | Performed by: ORTHOPAEDIC SURGERY

## 2023-11-21 PROCEDURE — 1159F MED LIST DOCD IN RCRD: CPT | Performed by: ORTHOPAEDIC SURGERY

## 2023-11-21 PROCEDURE — 73502 X-RAY EXAM HIP UNI 2-3 VIEWS: CPT | Mod: RIGHT SIDE | Performed by: STUDENT IN AN ORGANIZED HEALTH CARE EDUCATION/TRAINING PROGRAM

## 2023-11-21 NOTE — PROGRESS NOTES
ORTHOPEDIC TOTAL JOINT  POST-OPERATIVE FOLLOW UP      ============================  IMPRESSION/PLAN:  ============================  73 y.o. female s/p Right Total Hip Replacement completed on 11/18/2022.   1.  Status post right hip replacement  2.  Right extremity conditioning    PLAN:  -Discussed with patient findings above.  Overall her x-rays demonstrate well positioned hip replacement no signs of loosening or failure.  She still is not back to her normal activities and has some pain in the groin and inner thigh.  I do think majority of her symptoms are due to deconditioning as she has never failed to return to her normal life activities.  I did recommend that instead of home exercises that she get an evaluation of physical therapy to work on lower extremity strengthening to bridge her back to her regular exercise program.  Patient is agreeable this plan of care.  We will follow-up as needed otherwise        Zara CHRISTI Motta presents today for follow up of joint replacement above. Pain controlled with current treatment plan. They are currently ambulating with  no assistive devices .     Review of Systems:   Constitutional: See HPI for pain assessment, No significant weight loss, recent trauma. Denies fevers/chills  Cardiovascular: No chest pain, shortness of breath  Respiratory: No difficulty breathing, cough  Gastrointestinal: No nausea, vomiting, diarrhea, constipation  Musculoskeletal: Noted in HPI, no arthralgias   Integumentary: No rashes, easy bruising, redness   Neurological: no numbness or tingling in extremities, no gait disturbances     Patient Active Problem List   Diagnosis    Abnormal mammogram    Age-related nuclear cataract, left    Astigmatism of both eyes with presbyopia    Machuca's esophagus    Bilateral knee pain    Brow ptosis, bilateral    Cataract, nuclear sclerotic, both eyes    Chronic GERD    Hiatal hernia    Insomnia    Low back pain    Non morbid obesity due to excess calories    OAB  (overactive bladder)    Obesity (BMI 30-39.9)    Osteoarthritis of knee    PMB (postmenopausal bleeding)    Ptosis of both eyelids    Dyspnea    Dyspepsia    Dyslipidemia    Diastolic dysfunction, left ventricle    Combined form of age-related cataract, both eyes    Deep vein thrombosis (CMS/HCC)    Dermatochalasis of left upper eyelid    Dermatochalasis of right upper eyelid    Recurrent major depressive disorder, in partial remission (CMS/HCC)    Rhinitis    Right hip pain    Right inguinal pain    Sciatica of right side    Steatoblepharon    Uvulitis    Varicose veins with inflammation    Visual field defect    Pre-op evaluation    Class 1 obesity due to excess calories without serious comorbidity with body mass index (BMI) of 33.0 to 33.9 in adult    Hyperlipidemia, unspecified    Gastro-esophageal reflux disease without esophagitis    Class 1 obesity due to excess calories without serious comorbidity with body mass index (BMI) of 32.0 to 32.9 in adult       =================================  EXAM  =================================  GENERAL: A/Ox3, NAD. Appears healthy, well nourished  CARDIAC: regular rate  LUNGS: Breathing non-labored    MUSCULOSKELETAL:  Laterality: right Hip exam  - Strength: Abduction 5/5, Flexion 5/5.  Overall has weakness in hip flexion when compared to contralateral side against resistance  - Palpation: non TTP along surgical site  - EHL/PF/DF motor intact  - compartments soft, negative homans  - Gait: using no assistive device    NEUROVASCULAR:  - Neurovascular Status: sensation intact to light touch distally  - Capillary refill brisk at extremities, Bilateral dorsalis pedis pulse 2+     IMAGING: Multi views right hip and pelvis noted no signs of loosening failure right total hip arthroplasty. X-rays were personally reviewed by me.  Radiology reports were reviewed by me as well, if available at the time.        Myles Schaefer DO  Attending Surgeon  Joint Replacement and Adult  Reconstructive Surgery  [place], [place]

## 2023-11-25 ENCOUNTER — PHARMACY VISIT (OUTPATIENT)
Dept: PHARMACY | Facility: CLINIC | Age: 73
End: 2023-11-25
Payer: COMMERCIAL

## 2023-11-25 PROCEDURE — RXMED WILLOW AMBULATORY MEDICATION CHARGE

## 2023-11-29 ENCOUNTER — TELEMEDICINE (OUTPATIENT)
Dept: PHARMACY | Facility: HOSPITAL | Age: 73
End: 2023-11-29

## 2023-11-29 DIAGNOSIS — Z86.718 HX OF DEEP VENOUS THROMBOSIS: ICD-10-CM

## 2023-11-29 NOTE — PROGRESS NOTES
Subjective   Patient ID: Zara Motta is a 73 y.o. female who presents for Dvt. At last visit, application was started for  Patient Assistance for Eliquis.     Referring Provider: Ha Damian MD     DISCUSSION  Patient did not qualify for  Patient Assistance as this time as her household income exceeded the qualification limit  Patient was set up with a 30 day free voucher of Eliquis which is due to arrive to patient's home today  Patient states that although expensive, she is still capable of affording medication and will not go without    Assessment/Plan   CONTINUE taking all medications as prescribed  Will reach back out to patient if  Patient Assistance Program income qualification limit is raised next year    Zoe Louie PharmD    Continue all meds under the continuation of care with the referring provider and clinical pharmacy team.

## 2023-12-29 RX ORDER — OXYBUTYNIN CHLORIDE 10 MG/1
10 TABLET, EXTENDED RELEASE ORAL DAILY
Qty: 90 TABLET | Refills: 1 | OUTPATIENT
Start: 2023-12-29

## 2024-01-02 ENCOUNTER — APPOINTMENT (OUTPATIENT)
Dept: RADIOLOGY | Facility: CLINIC | Age: 74
End: 2024-01-02
Payer: MEDICARE

## 2024-01-04 ENCOUNTER — OFFICE VISIT (OUTPATIENT)
Dept: PRIMARY CARE | Facility: CLINIC | Age: 74
End: 2024-01-04
Payer: MEDICARE

## 2024-01-04 ENCOUNTER — TELEPHONE (OUTPATIENT)
Dept: PRIMARY CARE | Facility: CLINIC | Age: 74
End: 2024-01-04

## 2024-01-04 VITALS
HEART RATE: 72 BPM | SYSTOLIC BLOOD PRESSURE: 134 MMHG | OXYGEN SATURATION: 96 % | RESPIRATION RATE: 12 BRPM | DIASTOLIC BLOOD PRESSURE: 84 MMHG | TEMPERATURE: 97.6 F | HEIGHT: 66 IN | WEIGHT: 208 LBS | BODY MASS INDEX: 33.43 KG/M2

## 2024-01-04 DIAGNOSIS — N32.81 OAB (OVERACTIVE BLADDER): ICD-10-CM

## 2024-01-04 DIAGNOSIS — N39.0 ACUTE UTI: ICD-10-CM

## 2024-01-04 DIAGNOSIS — J01.10 ACUTE NON-RECURRENT FRONTAL SINUSITIS: Primary | ICD-10-CM

## 2024-01-04 LAB
POC APPEARANCE, URINE: CLEAR
POC BILIRUBIN, URINE: NEGATIVE
POC BLOOD, URINE: NEGATIVE
POC COLOR, URINE: YELLOW
POC GLUCOSE, URINE: NEGATIVE MG/DL
POC KETONES, URINE: NEGATIVE MG/DL
POC LEUKOCYTES, URINE: ABNORMAL
POC NITRITE,URINE: NEGATIVE
POC PH, URINE: 7 PH
POC PROTEIN, URINE: ABNORMAL MG/DL
POC SPECIFIC GRAVITY, URINE: 1.02
POC UROBILINOGEN, URINE: 0.2 EU/DL

## 2024-01-04 PROCEDURE — 1036F TOBACCO NON-USER: CPT | Performed by: FAMILY MEDICINE

## 2024-01-04 PROCEDURE — 81003 URINALYSIS AUTO W/O SCOPE: CPT

## 2024-01-04 PROCEDURE — 1126F AMNT PAIN NOTED NONE PRSNT: CPT | Performed by: FAMILY MEDICINE

## 2024-01-04 PROCEDURE — 81003 URINALYSIS AUTO W/O SCOPE: CPT | Performed by: FAMILY MEDICINE

## 2024-01-04 PROCEDURE — 99213 OFFICE O/P EST LOW 20 MIN: CPT | Performed by: FAMILY MEDICINE

## 2024-01-04 PROCEDURE — 1159F MED LIST DOCD IN RCRD: CPT | Performed by: FAMILY MEDICINE

## 2024-01-04 PROCEDURE — 3008F BODY MASS INDEX DOCD: CPT | Performed by: FAMILY MEDICINE

## 2024-01-04 RX ORDER — CEFDINIR 300 MG/1
300 CAPSULE ORAL 2 TIMES DAILY
Qty: 20 CAPSULE | Refills: 0 | Status: SHIPPED | OUTPATIENT
Start: 2024-01-04 | End: 2024-01-14

## 2024-01-04 RX ORDER — BENZONATATE 200 MG/1
200 CAPSULE ORAL 3 TIMES DAILY PRN
Qty: 30 CAPSULE | Refills: 0 | Status: SHIPPED | OUTPATIENT
Start: 2024-01-04 | End: 2024-02-03

## 2024-01-04 ASSESSMENT — ENCOUNTER SYMPTOMS
SINUS PRESSURE: 1
CHEST TIGHTNESS: 0
BRUISES/BLEEDS EASILY: 0
FATIGUE: 1
DYSPHORIC MOOD: 0
FEVER: 0
ADENOPATHY: 0
APPETITE CHANGE: 0
ABDOMINAL DISTENTION: 0
LIGHT-HEADEDNESS: 0
ABDOMINAL PAIN: 1
ARTHRALGIAS: 0
DIARRHEA: 0
HEADACHES: 0
DIFFICULTY URINATING: 0
COUGH: 1
FREQUENCY: 1
WHEEZING: 0
NERVOUS/ANXIOUS: 0
SHORTNESS OF BREATH: 0
SLEEP DISTURBANCE: 0
CHILLS: 1
DYSURIA: 1
SORE THROAT: 1
MYALGIAS: 0
NAUSEA: 0

## 2024-01-04 NOTE — PROGRESS NOTES
"Subjective   Patient ID: Zara Motta is a 73 y.o. female who presents for Sinusitis and UTI.  Pt has frontal sinus pressure, nasal drainage, ST, cough, and ear pressure and swoshing . Onset was about 3 weeks ago.    Pt reports symptoms are worsening.   Pt has tried OTC decongestant, Tylenol for treatment without improvement.    Also has urinary frequency, burning and abdominal pain for about 2 weeks. Hasn't tried any tx.    Sinusitis  Associated symptoms include chills, congestion, coughing, sinus pressure and a sore throat. Pertinent negatives include no ear pain, headaches or shortness of breath.   UTI   Associated symptoms include chills and frequency. Pertinent negatives include no nausea.       Review of Systems   Constitutional:  Positive for chills and fatigue. Negative for appetite change and fever.   HENT:  Positive for congestion, postnasal drip, sinus pressure and sore throat. Negative for ear pain.    Eyes:  Negative for visual disturbance.   Respiratory:  Positive for cough. Negative for chest tightness, shortness of breath and wheezing.    Cardiovascular:  Negative for chest pain.   Gastrointestinal:  Positive for abdominal pain. Negative for abdominal distention, diarrhea and nausea.   Genitourinary:  Positive for dysuria and frequency. Negative for difficulty urinating and pelvic pain.   Musculoskeletal:  Negative for arthralgias and myalgias.   Skin:  Negative for rash.   Allergic/Immunologic: Negative for immunocompromised state.   Neurological:  Negative for light-headedness and headaches.   Hematological:  Negative for adenopathy. Does not bruise/bleed easily.   Psychiatric/Behavioral:  Negative for dysphoric mood and sleep disturbance. The patient is not nervous/anxious.        Objective   /84   Pulse 72   Temp 36.4 °C (97.6 °F)   Resp 12   Ht 1.676 m (5' 6\")   Wt 94.3 kg (208 lb)   SpO2 96%   BMI 33.57 kg/m²    Physical Exam  Constitutional:       General: She is not in acute " distress.     Appearance: Normal appearance.   HENT:      Right Ear: No middle ear effusion. There is no impacted cerumen. Tympanic membrane is retracted.      Left Ear:  No middle ear effusion. There is no impacted cerumen. Tympanic membrane is retracted.   Cardiovascular:      Rate and Rhythm: Normal rate and regular rhythm.      Heart sounds: Normal heart sounds. No murmur heard.  Pulmonary:      Effort: Pulmonary effort is normal.      Breath sounds: Normal breath sounds.   Abdominal:      Palpations: Abdomen is soft.      Tenderness: There is no abdominal tenderness.   Neurological:      Mental Status: She is alert.   Psychiatric:         Mood and Affect: Mood normal.         Judgment: Judgment normal.           Assessment/Plan   Diagnoses and all orders for this visit:  Acute non-recurrent frontal sinusitis/Acute UTI - treat both with Cefdinir. Recommend rest, increased fluids, nasal saline at least 3x daily (Ie. Sinus Rinse), and Tylenol as needed.     Follow up as planned

## 2024-01-04 NOTE — PROGRESS NOTES
"Subjective   Patient ID: Zara Motta is a 73 y.o. female who presents for Sinusitis and UTI.    HPI     Review of Systems    Objective   /84   Pulse 72   Resp 12   Ht 1.676 m (5' 6\")   Wt 94.3 kg (208 lb)   SpO2 96%   BMI 33.57 kg/m²     Physical Exam    Assessment/Plan          "

## 2024-01-05 ENCOUNTER — APPOINTMENT (OUTPATIENT)
Dept: PHYSICAL THERAPY | Facility: CLINIC | Age: 74
End: 2024-01-05
Payer: MEDICARE

## 2024-01-05 ENCOUNTER — TELEPHONE (OUTPATIENT)
Dept: PRIMARY CARE | Facility: CLINIC | Age: 74
End: 2024-01-05
Payer: MEDICARE

## 2024-01-05 LAB
APPEARANCE UR: CLEAR
BILIRUB UR STRIP.AUTO-MCNC: NEGATIVE MG/DL
COLOR UR: YELLOW
GLUCOSE UR STRIP.AUTO-MCNC: NEGATIVE MG/DL
HOLD SPECIMEN: NORMAL
KETONES UR STRIP.AUTO-MCNC: NEGATIVE MG/DL
LEUKOCYTE ESTERASE UR QL STRIP.AUTO: NEGATIVE
NITRITE UR QL STRIP.AUTO: NEGATIVE
PH UR STRIP.AUTO: 7 [PH]
PROT UR STRIP.AUTO-MCNC: NEGATIVE MG/DL
RBC # UR STRIP.AUTO: NEGATIVE /UL
SP GR UR STRIP.AUTO: 1.02
UROBILINOGEN UR STRIP.AUTO-MCNC: <2 MG/DL

## 2024-01-05 NOTE — TELEPHONE ENCOUNTER
----- Message from Ha Damian MD sent at 1/5/2024  7:17 AM EST -----  UA is normal, should see GYN for urinary symptoms  ----- Message -----  From: Siomara Ta LPN  Sent: 1/4/2024   1:13 PM EST  To: Ha Damian MD

## 2024-01-09 ENCOUNTER — ANCILLARY PROCEDURE (OUTPATIENT)
Dept: RADIOLOGY | Facility: CLINIC | Age: 74
End: 2024-01-09
Payer: MEDICARE

## 2024-01-09 DIAGNOSIS — Z12.31 SCREENING MAMMOGRAM FOR BREAST CANCER: ICD-10-CM

## 2024-01-09 PROCEDURE — 77067 SCR MAMMO BI INCL CAD: CPT

## 2024-01-09 PROCEDURE — 77067 SCR MAMMO BI INCL CAD: CPT | Performed by: RADIOLOGY

## 2024-01-09 PROCEDURE — 77063 BREAST TOMOSYNTHESIS BI: CPT | Performed by: RADIOLOGY

## 2024-01-16 ENCOUNTER — EVALUATION (OUTPATIENT)
Dept: PHYSICAL THERAPY | Facility: CLINIC | Age: 74
End: 2024-01-16
Payer: MEDICARE

## 2024-01-16 DIAGNOSIS — M25.551 RIGHT HIP PAIN: ICD-10-CM

## 2024-01-16 PROCEDURE — 97161 PT EVAL LOW COMPLEX 20 MIN: CPT | Mod: GP | Performed by: PHYSICAL THERAPIST

## 2024-01-16 PROCEDURE — 97535 SELF CARE MNGMENT TRAINING: CPT | Mod: GP | Performed by: PHYSICAL THERAPIST

## 2024-01-16 ASSESSMENT — ENCOUNTER SYMPTOMS
DEPRESSION: 0
OCCASIONAL FEELINGS OF UNSTEADINESS: 1
LOSS OF SENSATION IN FEET: 0

## 2024-01-16 NOTE — PROGRESS NOTES
Physical Therapy Evaluation    Patient Name: Zara Motta  MRN: [id number]  Today's Date: 1/16/2024  Referred by:   Time Calculation  Start Time: 1020  Stop Time: 1105  Time Calculation (min): 45 min  Diagnosis:  1. Right hip pain  Referral to Physical Therapy    Follow Up In Physical Therapy      PRECAUTIONS:   Moderate fall risk    SUBJECTIVE:  Patient had right MINERVA in November 2022, history of LBP with bilateral radiculopathy in thighs, recent back injections without improvement, reports continued anterior right hip and groin pain, RLE weakness, MRI of hip last year showed hip flexor tendinitis and gluteal atrophy, did have anterior hip injection without relief, will be out of town next month.  Pain:  5-6/10 anterior right hip pain  Home Living:  Multi story home, lives with   Prior level of function:  Difficulty with ambulation for years due to hip OA and LBP    OBJECTIVE:  Hip PROM: (degrees) Left Right   Flexion  100   Extension  0   Abduction  45   External Rotation  40   Internal Rotation  10     Hip Strength: MMT Left Right   Flexion /5 3-/5   Extension /5 3-/5   Abduction /5 2+/5   External Rotation /5 3-/5   Internal Rotation /5 4-/5   *denotes pain with motion or muscle testing  Gait:  Trendelenburg style gait  Palpation:  + tenderness right hip adductors    ASSESSMENT:  Patient presents with c/o right hip pain and altered gait, has improved since she was last in therapy last year but continues to have significant right hip weakness as a major contributing factor, unclear how much low back issues are playing a part, should benefit from progressive strengthening exercise program, discussed importance of consistency and that improvement will take time.    TREATMENT:  Initial evaluation performed followed by discussion of findings and instruction in HEP.    PATIENT EDUCATION:  [facility] Bridge was not working, copy of exercises will be emailed to patient    PLAN:   HEP daily, will follow up in the clinic  in a month following vacation.    Rehab potential:  Good  Plan of care agreement  Patient    GOALS:  Active       PT Problem       Decrease c/o right hip pain to 2/10 at worst       Start:  01/16/24    Expected End:  04/15/24            Improve right hip strength to at least 4-/5 in all directions       Start:  01/16/24    Expected End:  04/15/24            Independent with HEP, able to ambulate community distances with good form        Start:  01/16/24    Expected End:  04/15/24

## 2024-02-16 ENCOUNTER — APPOINTMENT (OUTPATIENT)
Dept: PHYSICAL THERAPY | Facility: CLINIC | Age: 74
End: 2024-02-16
Payer: MEDICARE

## 2024-02-23 ENCOUNTER — TREATMENT (OUTPATIENT)
Dept: PHYSICAL THERAPY | Facility: CLINIC | Age: 74
End: 2024-02-23
Payer: MEDICARE

## 2024-02-23 DIAGNOSIS — M25.551 RIGHT HIP PAIN: Primary | ICD-10-CM

## 2024-02-23 PROCEDURE — 97110 THERAPEUTIC EXERCISES: CPT | Mod: GP | Performed by: PHYSICAL THERAPIST

## 2024-02-23 NOTE — PROGRESS NOTES
Physical Therapy Treatment    Patient Name: Zara Motta  MRN: [id number]  Today's Date: 2/23/2024  Visit: 2/MN  Diagnosis:   1. Right hip pain        PRECAUTIONS:  Low fall risk    SUBJECTIVE:  Patient out of town last week, reports consistency with HEP, not much improvement reported and patient feels symptoms are mostly coming from her back.  PAIN:   5/10 anterior right hip    OBJECTIVE:  Right hip strength: flexion 3-/5, abd 3-/5    TREATMENT:  - Therex:   Rec bike x 10 min L8  Total hip abd 3x10 L3  TG squat 3x15 L7+1  BLTB bridge m3x10  GTB supine clam shell 3x15  Clam shell 3x10  SL SLR 3x15    ASSESSMENT:  Patient with continued right hip weakness, equally weak in ER on left side, challenged by strength exercises, discussed importance of ongoing strengthening at home, do feel weakness is most likely due to low back issues vs ortho hip, gave name of several ortho spine physicians.    PLAN:   Continue with HEP, follow up in therapy as able.

## 2024-02-26 ENCOUNTER — OFFICE VISIT (OUTPATIENT)
Dept: PRIMARY CARE | Facility: CLINIC | Age: 74
End: 2024-02-26
Payer: MEDICARE

## 2024-02-26 VITALS
HEART RATE: 64 BPM | HEIGHT: 66 IN | RESPIRATION RATE: 12 BRPM | SYSTOLIC BLOOD PRESSURE: 126 MMHG | DIASTOLIC BLOOD PRESSURE: 78 MMHG | OXYGEN SATURATION: 97 % | BODY MASS INDEX: 32.78 KG/M2 | WEIGHT: 204 LBS

## 2024-02-26 DIAGNOSIS — E66.09 CLASS 1 OBESITY DUE TO EXCESS CALORIES WITHOUT SERIOUS COMORBIDITY WITH BODY MASS INDEX (BMI) OF 32.0 TO 32.9 IN ADULT: ICD-10-CM

## 2024-02-26 DIAGNOSIS — Z00.00 HEALTHCARE MAINTENANCE: Primary | ICD-10-CM

## 2024-02-26 DIAGNOSIS — E78.5 DYSLIPIDEMIA: ICD-10-CM

## 2024-02-26 PROCEDURE — G0439 PPPS, SUBSEQ VISIT: HCPCS | Performed by: FAMILY MEDICINE

## 2024-02-26 PROCEDURE — 1123F ACP DISCUSS/DSCN MKR DOCD: CPT | Performed by: FAMILY MEDICINE

## 2024-02-26 PROCEDURE — 1170F FXNL STATUS ASSESSED: CPT | Performed by: FAMILY MEDICINE

## 2024-02-26 PROCEDURE — 1159F MED LIST DOCD IN RCRD: CPT | Performed by: FAMILY MEDICINE

## 2024-02-26 PROCEDURE — 3008F BODY MASS INDEX DOCD: CPT | Performed by: FAMILY MEDICINE

## 2024-02-26 PROCEDURE — 1160F RVW MEDS BY RX/DR IN RCRD: CPT | Performed by: FAMILY MEDICINE

## 2024-02-26 PROCEDURE — 1036F TOBACCO NON-USER: CPT | Performed by: FAMILY MEDICINE

## 2024-02-26 PROCEDURE — 1126F AMNT PAIN NOTED NONE PRSNT: CPT | Performed by: FAMILY MEDICINE

## 2024-02-26 ASSESSMENT — ENCOUNTER SYMPTOMS
FEVER: 0
SLEEP DISTURBANCE: 0
DIFFICULTY URINATING: 0
DYSURIA: 0
APPETITE CHANGE: 0
LIGHT-HEADEDNESS: 0
SHORTNESS OF BREATH: 0
FATIGUE: 0
OCCASIONAL FEELINGS OF UNSTEADINESS: 0
NERVOUS/ANXIOUS: 0
WHEEZING: 0
ARTHRALGIAS: 1
DIARRHEA: 0
BACK PAIN: 1
ABDOMINAL PAIN: 0
BRUISES/BLEEDS EASILY: 0
DEPRESSION: 0
LOSS OF SENSATION IN FEET: 0
CHILLS: 0
ABDOMINAL DISTENTION: 0
MYALGIAS: 0
SINUS PRESSURE: 0
HEADACHES: 0
NAUSEA: 0
ADENOPATHY: 0
COUGH: 1
DYSPHORIC MOOD: 0
CHEST TIGHTNESS: 0

## 2024-02-26 ASSESSMENT — ACTIVITIES OF DAILY LIVING (ADL)
MANAGING_FINANCES: INDEPENDENT
GROCERY_SHOPPING: INDEPENDENT
BATHING: INDEPENDENT
DRESSING: INDEPENDENT
TAKING_MEDICATION: INDEPENDENT
DOING_HOUSEWORK: INDEPENDENT

## 2024-02-26 ASSESSMENT — PATIENT HEALTH QUESTIONNAIRE - PHQ9
1. LITTLE INTEREST OR PLEASURE IN DOING THINGS: NOT AT ALL
SUM OF ALL RESPONSES TO PHQ9 QUESTIONS 1 AND 2: 0
2. FEELING DOWN, DEPRESSED OR HOPELESS: NOT AT ALL

## 2024-02-26 NOTE — PROGRESS NOTES
"Subjective   Reason for Visit: Zara Motta is an 73 y.o. female here for a Medicare Wellness visit.     Past Medical, Surgical, and Family History reviewed and updated in chart.    Reviewed all medications by prescribing practitioner or clinical pharmacist (such as prescriptions, OTCs, herbal therapies and supplements) and documented in the medical record.    HPI    Patient Care Team:  Ha Damian MD as PCP - General  Ha Damian MD as PCP - [employer] PCP     Review of Systems    Objective   Vitals:  /78   Pulse 64   Resp 12   Ht 1.676 m (5' 6\")   Wt 92.5 kg (204 lb)   LMP  (LMP Unknown)   SpO2 97%   BMI 32.93 kg/m²       Physical Exam    Assessment/Plan   Problem List Items Addressed This Visit    None         "

## 2024-02-26 NOTE — PROGRESS NOTES
Subjective   Patient ID: Zara Motta is a 73 y.o. female who presents for Medicare Annual Wellness Visit Subsequent.  PMHX, PSHx, Fam hx, and Social hx reviewed.   New concerns  - had COVID recently about 2 weeks, gradually improving. Had spinal injx in January without much help for back pain.   Vaccines Shingles vaccines recommended  Dentist seen at least yearly yes  Vision concerns none  Hearing concerns none  Diet is usually overall healthy.   Smoker - no  Alcohol use - 0-1 drinks per week  Exercising 0 days per week.   Colonoscopy 2023, next  5yrs         Review of Systems   Constitutional:  Negative for appetite change, chills, fatigue and fever.   HENT:  Negative for congestion, ear pain and sinus pressure.    Eyes:  Negative for visual disturbance.   Respiratory:  Positive for cough. Negative for chest tightness, shortness of breath and wheezing.    Cardiovascular:  Negative for chest pain.   Gastrointestinal:  Negative for abdominal distention, abdominal pain, diarrhea and nausea.   Genitourinary:  Negative for difficulty urinating, dysuria and pelvic pain.   Musculoskeletal:  Positive for arthralgias and back pain. Negative for myalgias.   Skin:  Negative for rash.   Allergic/Immunologic: Negative for immunocompromised state.   Neurological:  Negative for light-headedness and headaches.   Hematological:  Negative for adenopathy. Does not bruise/bleed easily.   Psychiatric/Behavioral:  Negative for dysphoric mood and sleep disturbance. The patient is not nervous/anxious.    Medicare Wellness Billing Compliance Satisfied    *This is a visual tool to show completion of required items on the day of the visit. Green checks will only appear on the date of visit.    Review all medications by prescribing practitioner or clinical pharmacist (such as prescriptions, OTCs, herbal therapies and supplements) documented in the medical record    Past Medical, Surgical, and Family History reviewed and updated in chart  "   Tobacco Use Reviewed    Alcohol Use Reviewed    Illicit Drug Use Reviewed    PHQ2/9    Falls in Last Year Reviewed    Home Safety Risk Factors Reviewed    Cognitive Impairment Reviewed    Patient Self Assessment and Health Status    Current Diet Reviewed    Exercise Frequency    ADL - Hearing Impairment    ADL - Bathing    ADL - Dressing    ADL - Walks in Home    IADL - Managing Finances    IADL - Grocery Shopping    IADL - Taking Medications    IADL - Doing Housework        Objective   /78   Pulse 64   Resp 12   Ht 1.676 m (5' 6\")   Wt 92.5 kg (204 lb)   LMP  (LMP Unknown)   SpO2 97%   BMI 32.93 kg/m²    Physical Exam  Constitutional:       General: She is not in acute distress.     Appearance: Normal appearance. She is not ill-appearing.   HENT:      Head: Normocephalic and atraumatic.      Right Ear: Tympanic membrane, ear canal and external ear normal.      Left Ear: Tympanic membrane, ear canal and external ear normal.      Nose: Nose normal.      Mouth/Throat:      Mouth: Mucous membranes are moist.      Pharynx: No oropharyngeal exudate or posterior oropharyngeal erythema.   Eyes:      Extraocular Movements: Extraocular movements intact.      Conjunctiva/sclera: Conjunctivae normal.      Pupils: Pupils are equal, round, and reactive to light.   Neck:      Vascular: No carotid bruit.   Cardiovascular:      Rate and Rhythm: Normal rate and regular rhythm.      Heart sounds: Normal heart sounds. No murmur heard.  Pulmonary:      Breath sounds: Normal breath sounds. No wheezing, rhonchi or rales.   Abdominal:      General: Bowel sounds are normal. There is no distension.      Palpations: Abdomen is soft. There is no mass.      Tenderness: There is no abdominal tenderness.   Musculoskeletal:         General: No swelling or deformity.      Cervical back: Neck supple. No tenderness.   Lymphadenopathy:      Cervical: No cervical adenopathy.   Skin:     General: Skin is warm and dry. "      Findings: No lesion or rash.   Neurological:      Mental Status: She is alert and oriented to person, place, and time.      Sensory: No sensory deficit.      Motor: No weakness.      Coordination: Coordination normal.      Deep Tendon Reflexes: Reflexes normal.   Psychiatric:         Mood and Affect: Mood normal.         Behavior: Behavior normal.         Judgment: Judgment normal.           Assessment/Plan   Diagnoses and all orders for this visit:  Healthcare  - Shingles vaccines recommended at pharmacy. Other vaccines current. Labs ordered to check before next visit. Colonoscopy current. Mammogram current. Keep working on diet and exercise as able.     Weight - recommend low carb diet, increasing water intake to at least 64oz/day, healthy snacking between meals, and regular cardiovascular exercise. Goal for weight loss is 1-2# per week.     Follow up in May as planned

## 2024-02-26 NOTE — PATIENT INSTRUCTIONS

## 2024-03-13 ENCOUNTER — TREATMENT (OUTPATIENT)
Dept: PHYSICAL THERAPY | Facility: CLINIC | Age: 74
End: 2024-03-13
Payer: MEDICARE

## 2024-03-13 DIAGNOSIS — M25.551 RIGHT HIP PAIN: Primary | ICD-10-CM

## 2024-03-13 PROCEDURE — 97110 THERAPEUTIC EXERCISES: CPT | Mod: GP | Performed by: PHYSICAL THERAPIST

## 2024-03-13 NOTE — PROGRESS NOTES
Physical Therapy Treatment    Patient Name: Zara Motta  MRN: [id number]  Today's Date: 3/13/2024  Visit: 3/MN  Diagnosis:   1. Right hip pain        PRECAUTIONS:  Low fall risk    SUBJECTIVE:  Patient reports consistency with HEP, no change in symptoms, pain in from of hip with functional activities like stairs  PAIN:   5/10 anterior right hip    OBJECTIVE:  Right hip strength: flexion 3-/5, abd 3-/5    TREATMENT:  - Therex:   Rec bike x 10 min L8  Total hip abd 3x10 L3 ea  Right total hip flexion 3x10 L3  TG squat 3x15 L7+2  BLTB bridge 3x15  BLTB supine clam shell 3x15  Clam shell 3x10  SL SLR 3x15    ASSESSMENT:  Patient with continued hip weakness, specifically abd and flexion, symptoms most likely due to back issues, will continue in therapy for several more weeks and recommend back ortho if symptoms not improving.  PLAN:   Continue with HEP, follow up in therapy as able.

## 2024-03-14 ENCOUNTER — HOSPITAL ENCOUNTER (OUTPATIENT)
Dept: RADIOLOGY | Facility: EXTERNAL LOCATION | Age: 74
Discharge: HOME | End: 2024-03-14

## 2024-03-14 DIAGNOSIS — M25.571 RIGHT ANKLE PAIN, UNSPECIFIED CHRONICITY: ICD-10-CM

## 2024-03-14 DIAGNOSIS — M79.671 RIGHT FOOT PAIN: ICD-10-CM

## 2024-03-20 ENCOUNTER — APPOINTMENT (OUTPATIENT)
Dept: PHYSICAL THERAPY | Facility: CLINIC | Age: 74
End: 2024-03-20
Payer: MEDICARE

## 2024-03-27 ENCOUNTER — APPOINTMENT (OUTPATIENT)
Dept: PHYSICAL THERAPY | Facility: CLINIC | Age: 74
End: 2024-03-27
Payer: MEDICARE

## 2024-03-29 ENCOUNTER — HOME HEALTH ADMISSION (OUTPATIENT)
Dept: HOME HEALTH SERVICES | Facility: HOME HEALTH | Age: 74
End: 2024-03-29
Payer: MEDICARE

## 2024-03-29 ENCOUNTER — DOCUMENTATION (OUTPATIENT)
Dept: HOME HEALTH SERVICES | Facility: HOME HEALTH | Age: 74
End: 2024-03-29
Payer: MEDICARE

## 2024-03-29 NOTE — HH CARE COORDINATION
Home Care received a Referral for Physical Therapy and Occupational Therapy. We have processed the referral for a Start of Care on 3.31.24 to 4.01.24.     If you have any questions or concerns, please feel free to contact us at [phone]. Follow the prompts, enter your five digit zip code, and you will be directed to your care team on [facility] 2.

## 2024-04-01 ENCOUNTER — HOME CARE VISIT (OUTPATIENT)
Dept: HOME HEALTH SERVICES | Facility: HOME HEALTH | Age: 74
End: 2024-04-01
Payer: MEDICARE

## 2024-04-01 ENCOUNTER — DOCUMENTATION (OUTPATIENT)
Dept: PRIMARY CARE | Facility: CLINIC | Age: 74
End: 2024-04-01
Payer: MEDICARE

## 2024-04-01 PROCEDURE — 0023 HH SOC

## 2024-04-01 PROCEDURE — G0151 HHCP-SERV OF PT,EA 15 MIN: HCPCS

## 2024-04-01 RX ORDER — METHOCARBAMOL 500 MG/1
1 TABLET, FILM COATED ORAL EVERY 6 HOURS PRN
COMMUNITY
Start: 2018-05-14

## 2024-04-01 RX ORDER — HYDROCODONE BITARTRATE AND ACETAMINOPHEN 5; 325 MG/1; MG/1
1 TABLET ORAL EVERY 4 HOURS PRN
COMMUNITY
Start: 2024-03-29 | End: 2024-04-05

## 2024-04-03 ENCOUNTER — APPOINTMENT (OUTPATIENT)
Dept: PHYSICAL THERAPY | Facility: CLINIC | Age: 74
End: 2024-04-03

## 2024-04-03 ENCOUNTER — HOME CARE VISIT (OUTPATIENT)
Dept: HOME HEALTH SERVICES | Facility: HOME HEALTH | Age: 74
End: 2024-04-03
Payer: MEDICARE

## 2024-04-03 SDOH — ECONOMIC STABILITY: HOUSING INSECURITY: HOME SAFETY: NON WEIGHT BEARING RLE

## 2024-04-03 ASSESSMENT — ENCOUNTER SYMPTOMS
PAIN: 1
LIMITED RANGE OF MOTION: 1
PERSON REPORTING PAIN: PATIENT
MUSCLE WEAKNESS: 1

## 2024-04-03 ASSESSMENT — ACTIVITIES OF DAILY LIVING (ADL)
ENTERING_EXITING_HOME: TOTAL DEPENDENCE
OASIS_M1830: 05

## 2024-04-05 ENCOUNTER — HOME CARE VISIT (OUTPATIENT)
Dept: HOME HEALTH SERVICES | Facility: HOME HEALTH | Age: 74
End: 2024-04-05
Payer: MEDICARE

## 2024-04-05 VITALS
SYSTOLIC BLOOD PRESSURE: 130 MMHG | HEART RATE: 83 BPM | DIASTOLIC BLOOD PRESSURE: 84 MMHG | TEMPERATURE: 98.3 F | OXYGEN SATURATION: 99 %

## 2024-04-05 PROCEDURE — G0152 HHCP-SERV OF OT,EA 15 MIN: HCPCS

## 2024-04-05 PROCEDURE — G0157 HHC PT ASSISTANT EA 15: HCPCS

## 2024-04-05 SDOH — ECONOMIC STABILITY: HOUSING INSECURITY: HOME SAFETY: NWB R LE

## 2024-04-05 ASSESSMENT — ACTIVITIES OF DAILY LIVING (ADL)
BATHING ASSESSED: 1
BATHING_CURRENT_FUNCTION: SUPERVISION
WASHING_LB_CURRENT_FUNCTION: SUPERVISION
WASHING_UPB_CURRENT_FUNCTION: SUPERVISION
DRESSING_UB_CURRENT_FUNCTION: SUPERVISION
DRESSING_LB_CURRENT_FUNCTION: SUPERVISION

## 2024-04-05 ASSESSMENT — ENCOUNTER SYMPTOMS
DENIES PAIN: 1
PAIN LOCATION - EXACERBATING FACTORS: AMBULATION
PAIN LOCATION - PAIN SEVERITY: 1/10
PAIN LOCATION - PAIN FREQUENCY: CONSTANT
PAIN LOCATION - RELIEVING FACTORS: REST
PERSON REPORTING PAIN: PATIENT
PAIN LOCATION - PAIN QUALITY: ACHE
PAIN LOCATION - PAIN DURATION: CONSTANT
PAIN LOCATION: RIGHT ANKLE

## 2024-04-09 ENCOUNTER — HOME CARE VISIT (OUTPATIENT)
Dept: HOME HEALTH SERVICES | Facility: HOME HEALTH | Age: 74
End: 2024-04-09
Payer: MEDICARE

## 2024-04-09 PROCEDURE — G0157 HHC PT ASSISTANT EA 15: HCPCS

## 2024-04-11 ENCOUNTER — HOME CARE VISIT (OUTPATIENT)
Dept: HOME HEALTH SERVICES | Facility: HOME HEALTH | Age: 74
End: 2024-04-11
Payer: MEDICARE

## 2024-04-11 PROCEDURE — G0157 HHC PT ASSISTANT EA 15: HCPCS | Mod: CQ

## 2024-04-12 ENCOUNTER — APPOINTMENT (OUTPATIENT)
Dept: HOME HEALTH SERVICES | Facility: HOME HEALTH | Age: 74
End: 2024-04-12
Payer: MEDICARE

## 2024-04-12 ENCOUNTER — HOME CARE VISIT (OUTPATIENT)
Dept: HOME HEALTH SERVICES | Facility: HOME HEALTH | Age: 74
End: 2024-04-12
Payer: MEDICARE

## 2024-04-16 ENCOUNTER — HOME CARE VISIT (OUTPATIENT)
Dept: HOME HEALTH SERVICES | Facility: HOME HEALTH | Age: 74
End: 2024-04-16
Payer: MEDICARE

## 2024-04-16 PROCEDURE — G0157 HHC PT ASSISTANT EA 15: HCPCS | Mod: CQ

## 2024-04-17 ENCOUNTER — OFFICE VISIT (OUTPATIENT)
Dept: PRIMARY CARE | Facility: CLINIC | Age: 74
End: 2024-04-17
Payer: MEDICARE

## 2024-04-17 VITALS
HEART RATE: 72 BPM | OXYGEN SATURATION: 96 % | BODY MASS INDEX: 32.93 KG/M2 | TEMPERATURE: 97.5 F | DIASTOLIC BLOOD PRESSURE: 76 MMHG | SYSTOLIC BLOOD PRESSURE: 128 MMHG | HEIGHT: 66 IN | RESPIRATION RATE: 12 BRPM

## 2024-04-17 DIAGNOSIS — Z00.00 HEALTHCARE MAINTENANCE: ICD-10-CM

## 2024-04-17 DIAGNOSIS — J15.9 COMMUNITY ACQUIRED BACTERIAL PNEUMONIA: Primary | ICD-10-CM

## 2024-04-17 DIAGNOSIS — R68.83 CHILLS: ICD-10-CM

## 2024-04-17 DIAGNOSIS — S82.841D CLOSED BIMALLEOLAR FRACTURE OF RIGHT ANKLE WITH ROUTINE HEALING, SUBSEQUENT ENCOUNTER: ICD-10-CM

## 2024-04-17 DIAGNOSIS — E78.5 DYSLIPIDEMIA: ICD-10-CM

## 2024-04-17 DIAGNOSIS — I82.4Y9 DEEP VEIN THROMBOSIS (DVT) OF PROXIMAL LOWER EXTREMITY, UNSPECIFIED CHRONICITY, UNSPECIFIED LATERALITY (MULTI): ICD-10-CM

## 2024-04-17 PROCEDURE — 1160F RVW MEDS BY RX/DR IN RCRD: CPT | Performed by: FAMILY MEDICINE

## 2024-04-17 PROCEDURE — 3008F BODY MASS INDEX DOCD: CPT | Performed by: FAMILY MEDICINE

## 2024-04-17 PROCEDURE — 99214 OFFICE O/P EST MOD 30 MIN: CPT | Performed by: FAMILY MEDICINE

## 2024-04-17 PROCEDURE — 1159F MED LIST DOCD IN RCRD: CPT | Performed by: FAMILY MEDICINE

## 2024-04-17 PROCEDURE — 1123F ACP DISCUSS/DSCN MKR DOCD: CPT | Performed by: FAMILY MEDICINE

## 2024-04-17 PROCEDURE — 1036F TOBACCO NON-USER: CPT | Performed by: FAMILY MEDICINE

## 2024-04-17 RX ORDER — METHOCARBAMOL 500 MG/1
500 TABLET, FILM COATED ORAL 4 TIMES DAILY
COMMUNITY
End: 2024-05-13 | Stop reason: WASHOUT

## 2024-04-17 ASSESSMENT — ENCOUNTER SYMPTOMS
FATIGUE: 0
LIGHT-HEADEDNESS: 0
DIARRHEA: 0
SLEEP DISTURBANCE: 0
CHEST TIGHTNESS: 0
CHILLS: 0
NERVOUS/ANXIOUS: 0
ARTHRALGIAS: 0
DYSPHORIC MOOD: 0
ADENOPATHY: 0
DYSURIA: 0
FEVER: 0
NAUSEA: 0
SHORTNESS OF BREATH: 0
ABDOMINAL PAIN: 0
HEADACHES: 0
BRUISES/BLEEDS EASILY: 0
SINUS PRESSURE: 0
ABDOMINAL DISTENTION: 0
APPETITE CHANGE: 0
MYALGIAS: 0
DIFFICULTY URINATING: 0
WHEEZING: 0

## 2024-04-17 NOTE — PROGRESS NOTES
"Subjective   Patient ID: Zara Motta is a 73 y.o. female who presents for Hospital Follow-up.  Here for hospital follow up 3/14-19 for PNA, near syncope and fall, heady injury and R bimalleolar ankle fracture.  Tx Rocephin and Azithromycin. CXR improving before discharge. She had surgery 3/15 on R ankle. She had blood clot noted about 2 weeks ago. She was off Eliquis for about 5 days. She has appt with vascular.        Review of Systems   Constitutional:  Negative for appetite change, chills, fatigue and fever.   HENT:  Negative for congestion, ear pain and sinus pressure.    Eyes:  Negative for visual disturbance.   Respiratory:  Negative for chest tightness, shortness of breath and wheezing.    Cardiovascular:  Negative for chest pain.   Gastrointestinal:  Negative for abdominal distention, abdominal pain, diarrhea and nausea.   Genitourinary:  Negative for difficulty urinating, dysuria and pelvic pain.   Musculoskeletal:  Negative for arthralgias and myalgias.   Skin:  Negative for rash.   Allergic/Immunologic: Negative for immunocompromised state.   Neurological:  Negative for light-headedness and headaches.   Hematological:  Negative for adenopathy. Does not bruise/bleed easily.   Psychiatric/Behavioral:  Negative for dysphoric mood and sleep disturbance. The patient is not nervous/anxious.        Objective   /76   Pulse 72   Temp 36.4 °C (97.5 °F)   Resp 12   Ht 1.676 m (5' 6\")   LMP  (LMP Unknown)   SpO2 96%   BMI 32.93 kg/m²    Physical Exam  Constitutional:       General: She is not in acute distress.     Appearance: Normal appearance.   Cardiovascular:      Rate and Rhythm: Normal rate and regular rhythm.      Heart sounds: Normal heart sounds. No murmur heard.  Pulmonary:      Effort: Pulmonary effort is normal.      Breath sounds: Normal breath sounds.   Abdominal:      Palpations: Abdomen is soft.      Tenderness: There is no abdominal tenderness.   Neurological:      Mental Status: " She is alert.   Psychiatric:         Mood and Affect: Mood normal.         Judgment: Judgment normal.           Assessment/Plan   Diagnoses and all orders for this visit:  Community acquired bacterial pneumonia - finished adequate course of IV Abx. Clinically improved. Plan to recheck CXR in 2 weeks. With intermittent chills will repeat labs.  Closed bimalleolar fracture of right ankle with routine healing/Deep vein thrombosis (DVT) - keep follow up with orthopedics and vasular medicine in May.     Follow up here as planned

## 2024-04-17 NOTE — PROGRESS NOTES
"Subjective   Patient ID: Zara Motta is a 73 y.o. female who presents for Hospital Follow-up.    HPI     Review of Systems    Objective   /76   Pulse 72   Temp 36.4 °C (97.5 °F)   Resp 12   Ht 1.676 m (5' 6\")   LMP  (LMP Unknown)   SpO2 96%   BMI 32.93 kg/m²     Physical Exam    Assessment/Plan          "

## 2024-04-18 ENCOUNTER — HOME CARE VISIT (OUTPATIENT)
Dept: HOME HEALTH SERVICES | Facility: HOME HEALTH | Age: 74
End: 2024-04-18
Payer: MEDICARE

## 2024-04-18 PROCEDURE — G0157 HHC PT ASSISTANT EA 15: HCPCS | Mod: CQ

## 2024-04-24 ENCOUNTER — HOME CARE VISIT (OUTPATIENT)
Dept: HOME HEALTH SERVICES | Facility: HOME HEALTH | Age: 74
End: 2024-04-24
Payer: MEDICARE

## 2024-04-24 ASSESSMENT — ACTIVITIES OF DAILY LIVING (ADL)
HOME_HEALTH_OASIS: 01
OASIS_M1830: 04

## 2024-05-03 ENCOUNTER — TELEPHONE (OUTPATIENT)
Dept: HOME HEALTH SERVICES | Facility: HOME HEALTH | Age: 74
End: 2024-05-03
Payer: MEDICARE

## 2024-05-03 NOTE — TELEPHONE ENCOUNTER
Ha Damian MD       Home Care received a home care referral for Physical Therapy from [facility] DR. SIDNEY GASTON . The referral indicated that you would follow for home care. We are reaching out to confirm that you are agreeable to follow this patient for home care and sign relevant orders for this patient's ongoing home care needs.     Will you follow this patient for home care?     If yes, for your awareness, you will be receiving the orders to sign via [employer] in [facility].     Please treat this message as an urgent request, as we are unable to process the referral or provide home care to the patient until we have a provider agreeable to follow. We appreciate your time and commitment to quality, safe patient care.    Thank you,  [facility]     NED ESCOBAR LPN

## 2024-05-04 DIAGNOSIS — F33.41 RECURRENT MAJOR DEPRESSIVE DISORDER, IN PARTIAL REMISSION (CMS-HCC): ICD-10-CM

## 2024-05-04 DIAGNOSIS — E78.5 HYPERLIPIDEMIA, UNSPECIFIED: ICD-10-CM

## 2024-05-05 ENCOUNTER — HOME HEALTH ADMISSION (OUTPATIENT)
Dept: HOME HEALTH SERVICES | Facility: HOME HEALTH | Age: 74
End: 2024-05-05
Payer: MEDICARE

## 2024-05-05 ENCOUNTER — DOCUMENTATION (OUTPATIENT)
Dept: HOME HEALTH SERVICES | Facility: HOME HEALTH | Age: 74
End: 2024-05-05
Payer: MEDICARE

## 2024-05-05 NOTE — HH CARE COORDINATION
Home Care received a Referral for Physical Therapy. We have processed the referral for a Start of Care on 5/6-5/7.     If you have any questions or concerns, please feel free to contact us at [phone]. Follow the prompts, enter your five digit zip code, and you will be directed to your care team on CENT[facility] 2.

## 2024-05-06 ENCOUNTER — HOSPITAL ENCOUNTER (OUTPATIENT)
Dept: RADIOLOGY | Facility: CLINIC | Age: 74
Discharge: HOME | End: 2024-05-06
Payer: MEDICARE

## 2024-05-06 ENCOUNTER — LAB (OUTPATIENT)
Dept: LAB | Facility: LAB | Age: 74
End: 2024-05-06
Payer: MEDICARE

## 2024-05-06 ENCOUNTER — HOME CARE VISIT (OUTPATIENT)
Dept: HOME HEALTH SERVICES | Facility: HOME HEALTH | Age: 74
End: 2024-05-06
Payer: MEDICARE

## 2024-05-06 DIAGNOSIS — Z00.00 HEALTHCARE MAINTENANCE: ICD-10-CM

## 2024-05-06 DIAGNOSIS — E78.5 DYSLIPIDEMIA: ICD-10-CM

## 2024-05-06 DIAGNOSIS — R68.83 CHILLS: ICD-10-CM

## 2024-05-06 DIAGNOSIS — J15.9 COMMUNITY ACQUIRED BACTERIAL PNEUMONIA: ICD-10-CM

## 2024-05-06 LAB
ALBUMIN SERPL BCP-MCNC: 3.8 G/DL (ref 3.4–5)
ALP SERPL-CCNC: 70 U/L (ref 33–136)
ALT SERPL W P-5'-P-CCNC: 7 U/L (ref 7–45)
ANION GAP SERPL CALC-SCNC: 14 MMOL/L (ref 10–20)
AST SERPL W P-5'-P-CCNC: 10 U/L (ref 9–39)
BASOPHILS # BLD AUTO: 0.04 X10*3/UL (ref 0–0.1)
BASOPHILS NFR BLD AUTO: 0.9 %
BILIRUB SERPL-MCNC: 1.1 MG/DL (ref 0–1.2)
BUN SERPL-MCNC: 13 MG/DL (ref 6–23)
CALCIUM SERPL-MCNC: 9.4 MG/DL (ref 8.6–10.6)
CHLORIDE SERPL-SCNC: 105 MMOL/L (ref 98–107)
CHOLEST SERPL-MCNC: 174 MG/DL (ref 0–199)
CHOLESTEROL/HDL RATIO: 2.3
CO2 SERPL-SCNC: 28 MMOL/L (ref 21–32)
CREAT SERPL-MCNC: 0.84 MG/DL (ref 0.5–1.05)
EGFRCR SERPLBLD CKD-EPI 2021: 73 ML/MIN/1.73M*2
EOSINOPHIL # BLD AUTO: 0.11 X10*3/UL (ref 0–0.4)
EOSINOPHIL NFR BLD AUTO: 2.4 %
ERYTHROCYTE [DISTWIDTH] IN BLOOD BY AUTOMATED COUNT: 14.6 % (ref 11.5–14.5)
GLUCOSE SERPL-MCNC: 88 MG/DL (ref 74–99)
HCT VFR BLD AUTO: 34.4 % (ref 36–46)
HCV AB SER QL: NONREACTIVE
HDLC SERPL-MCNC: 74.7 MG/DL
HGB BLD-MCNC: 10.7 G/DL (ref 12–16)
IMM GRANULOCYTES # BLD AUTO: 0.01 X10*3/UL (ref 0–0.5)
IMM GRANULOCYTES NFR BLD AUTO: 0.2 % (ref 0–0.9)
LDLC SERPL CALC-MCNC: 86 MG/DL
LYMPHOCYTES # BLD AUTO: 1.11 X10*3/UL (ref 0.8–3)
LYMPHOCYTES NFR BLD AUTO: 24.7 %
MCH RBC QN AUTO: 27.4 PG (ref 26–34)
MCHC RBC AUTO-ENTMCNC: 31.1 G/DL (ref 32–36)
MCV RBC AUTO: 88 FL (ref 80–100)
MONOCYTES # BLD AUTO: 0.36 X10*3/UL (ref 0.05–0.8)
MONOCYTES NFR BLD AUTO: 8 %
NEUTROPHILS # BLD AUTO: 2.86 X10*3/UL (ref 1.6–5.5)
NEUTROPHILS NFR BLD AUTO: 63.8 %
NON HDL CHOLESTEROL: 99 MG/DL (ref 0–149)
NRBC BLD-RTO: 0 /100 WBCS (ref 0–0)
PLATELET # BLD AUTO: 266 X10*3/UL (ref 150–450)
POTASSIUM SERPL-SCNC: 4.5 MMOL/L (ref 3.5–5.3)
PROT SERPL-MCNC: 6.2 G/DL (ref 6.4–8.2)
RBC # BLD AUTO: 3.9 X10*6/UL (ref 4–5.2)
SODIUM SERPL-SCNC: 142 MMOL/L (ref 136–145)
TRIGL SERPL-MCNC: 69 MG/DL (ref 0–149)
TSH SERPL-ACNC: 2.37 MIU/L (ref 0.44–3.98)
VLDL: 14 MG/DL (ref 0–40)
WBC # BLD AUTO: 4.5 X10*3/UL (ref 4.4–11.3)

## 2024-05-06 PROCEDURE — 71046 X-RAY EXAM CHEST 2 VIEWS: CPT

## 2024-05-06 PROCEDURE — 0023 HH SOC

## 2024-05-06 PROCEDURE — 36415 COLL VENOUS BLD VENIPUNCTURE: CPT

## 2024-05-06 PROCEDURE — 80061 LIPID PANEL: CPT

## 2024-05-06 PROCEDURE — G0151 HHCP-SERV OF PT,EA 15 MIN: HCPCS

## 2024-05-06 PROCEDURE — 86803 HEPATITIS C AB TEST: CPT

## 2024-05-06 PROCEDURE — 71046 X-RAY EXAM CHEST 2 VIEWS: CPT | Performed by: RADIOLOGY

## 2024-05-06 PROCEDURE — 80053 COMPREHEN METABOLIC PANEL: CPT

## 2024-05-06 PROCEDURE — 87086 URINE CULTURE/COLONY COUNT: CPT

## 2024-05-06 PROCEDURE — 84443 ASSAY THYROID STIM HORMONE: CPT

## 2024-05-06 PROCEDURE — 85025 COMPLETE CBC W/AUTO DIFF WBC: CPT

## 2024-05-06 PROCEDURE — 81001 URINALYSIS AUTO W/SCOPE: CPT

## 2024-05-07 ENCOUNTER — HOME CARE VISIT (OUTPATIENT)
Dept: HOME HEALTH SERVICES | Facility: HOME HEALTH | Age: 74
End: 2024-05-07
Payer: MEDICARE

## 2024-05-07 DIAGNOSIS — D64.9 ANEMIA, UNSPECIFIED TYPE: Primary | ICD-10-CM

## 2024-05-07 LAB
APPEARANCE UR: ABNORMAL
BILIRUB UR STRIP.AUTO-MCNC: NEGATIVE MG/DL
CAOX CRY #/AREA UR COMP ASSIST: ABNORMAL /HPF
COLOR UR: ABNORMAL
GLUCOSE UR STRIP.AUTO-MCNC: NORMAL MG/DL
HOLD SPECIMEN: NORMAL
KETONES UR STRIP.AUTO-MCNC: NEGATIVE MG/DL
LEUKOCYTE ESTERASE UR QL STRIP.AUTO: ABNORMAL
MUCOUS THREADS #/AREA URNS AUTO: ABNORMAL /LPF
NITRITE UR QL STRIP.AUTO: NEGATIVE
PH UR STRIP.AUTO: 5.5 [PH]
PROT UR STRIP.AUTO-MCNC: ABNORMAL MG/DL
RBC # UR STRIP.AUTO: NEGATIVE /UL
RBC #/AREA URNS AUTO: ABNORMAL /HPF
SP GR UR STRIP.AUTO: 1.02
UROBILINOGEN UR STRIP.AUTO-MCNC: NORMAL MG/DL
WBC #/AREA URNS AUTO: ABNORMAL /HPF

## 2024-05-07 ASSESSMENT — ACTIVITIES OF DAILY LIVING (ADL)
ENTERING_EXITING_HOME: MINIMUM ASSIST
OASIS_M1830: 04

## 2024-05-07 ASSESSMENT — ENCOUNTER SYMPTOMS
LIMITED RANGE OF MOTION: 1
PERSON REPORTING PAIN: PATIENT
PAIN: 1
MUSCLE WEAKNESS: 1
OCCASIONAL FEELINGS OF UNSTEADINESS: 1

## 2024-05-08 ENCOUNTER — HOME CARE VISIT (OUTPATIENT)
Dept: HOME HEALTH SERVICES | Facility: HOME HEALTH | Age: 74
End: 2024-05-08
Payer: MEDICARE

## 2024-05-08 LAB — BACTERIA UR CULT: NORMAL

## 2024-05-08 PROCEDURE — G0157 HHC PT ASSISTANT EA 15: HCPCS | Mod: CQ

## 2024-05-13 ENCOUNTER — HOME CARE VISIT (OUTPATIENT)
Dept: HOME HEALTH SERVICES | Facility: HOME HEALTH | Age: 74
End: 2024-05-13
Payer: MEDICARE

## 2024-05-13 ENCOUNTER — OFFICE VISIT (OUTPATIENT)
Dept: PRIMARY CARE | Facility: CLINIC | Age: 74
End: 2024-05-13
Payer: MEDICARE

## 2024-05-13 VITALS
TEMPERATURE: 97.2 F | HEART RATE: 68 BPM | SYSTOLIC BLOOD PRESSURE: 124 MMHG | BODY MASS INDEX: 33.89 KG/M2 | RESPIRATION RATE: 12 BRPM | DIASTOLIC BLOOD PRESSURE: 76 MMHG | WEIGHT: 210 LBS

## 2024-05-13 DIAGNOSIS — I51.9 DIASTOLIC DYSFUNCTION, LEFT VENTRICLE: Primary | ICD-10-CM

## 2024-05-13 DIAGNOSIS — K21.9 GASTRO-ESOPHAGEAL REFLUX DISEASE WITHOUT ESOPHAGITIS: ICD-10-CM

## 2024-05-13 DIAGNOSIS — Z86.718 HX OF DEEP VENOUS THROMBOSIS: ICD-10-CM

## 2024-05-13 DIAGNOSIS — K22.719 BARRETT'S ESOPHAGUS WITH DYSPLASIA: ICD-10-CM

## 2024-05-13 DIAGNOSIS — F51.01 PRIMARY INSOMNIA: ICD-10-CM

## 2024-05-13 DIAGNOSIS — K44.9 HIATAL HERNIA: ICD-10-CM

## 2024-05-13 DIAGNOSIS — N32.81 OAB (OVERACTIVE BLADDER): ICD-10-CM

## 2024-05-13 DIAGNOSIS — F33.41 RECURRENT MAJOR DEPRESSIVE DISORDER, IN PARTIAL REMISSION (CMS-HCC): ICD-10-CM

## 2024-05-13 DIAGNOSIS — E78.5 HYPERLIPIDEMIA, UNSPECIFIED: ICD-10-CM

## 2024-05-13 DIAGNOSIS — E66.09 CLASS 1 OBESITY DUE TO EXCESS CALORIES WITHOUT SERIOUS COMORBIDITY WITH BODY MASS INDEX (BMI) OF 33.0 TO 33.9 IN ADULT: ICD-10-CM

## 2024-05-13 DIAGNOSIS — K21.9 CHRONIC GERD: ICD-10-CM

## 2024-05-13 DIAGNOSIS — I82.4Y9 DEEP VEIN THROMBOSIS (DVT) OF PROXIMAL LOWER EXTREMITY, UNSPECIFIED CHRONICITY, UNSPECIFIED LATERALITY (MULTI): ICD-10-CM

## 2024-05-13 PROBLEM — R10.31 RIGHT INGUINAL PAIN: Status: RESOLVED | Noted: 2023-05-16 | Resolved: 2024-05-13

## 2024-05-13 PROBLEM — R10.13 DYSPEPSIA: Status: RESOLVED | Noted: 2023-05-16 | Resolved: 2024-05-13

## 2024-05-13 PROBLEM — R06.00 DYSPNEA: Status: RESOLVED | Noted: 2023-05-16 | Resolved: 2024-05-13

## 2024-05-13 PROBLEM — N39.0 ACUTE UTI: Status: RESOLVED | Noted: 2023-05-16 | Resolved: 2024-05-13

## 2024-05-13 PROBLEM — J15.9 COMMUNITY ACQUIRED BACTERIAL PNEUMONIA: Status: RESOLVED | Noted: 2024-04-17 | Resolved: 2024-05-13

## 2024-05-13 PROCEDURE — 1123F ACP DISCUSS/DSCN MKR DOCD: CPT | Performed by: FAMILY MEDICINE

## 2024-05-13 PROCEDURE — 1160F RVW MEDS BY RX/DR IN RCRD: CPT | Performed by: FAMILY MEDICINE

## 2024-05-13 PROCEDURE — G0157 HHC PT ASSISTANT EA 15: HCPCS | Mod: CQ

## 2024-05-13 PROCEDURE — 3008F BODY MASS INDEX DOCD: CPT | Performed by: FAMILY MEDICINE

## 2024-05-13 PROCEDURE — 1036F TOBACCO NON-USER: CPT | Performed by: FAMILY MEDICINE

## 2024-05-13 PROCEDURE — 1159F MED LIST DOCD IN RCRD: CPT | Performed by: FAMILY MEDICINE

## 2024-05-13 PROCEDURE — 99214 OFFICE O/P EST MOD 30 MIN: CPT | Performed by: FAMILY MEDICINE

## 2024-05-13 RX ORDER — BUPROPION HYDROCHLORIDE 150 MG/1
150 TABLET ORAL DAILY
Qty: 90 TABLET | Refills: 1 | Status: SHIPPED | OUTPATIENT
Start: 2024-05-13

## 2024-05-13 RX ORDER — OXYBUTYNIN CHLORIDE 10 MG/1
10 TABLET, EXTENDED RELEASE ORAL DAILY
Qty: 90 TABLET | Refills: 1 | Status: SHIPPED | OUTPATIENT
Start: 2024-05-13

## 2024-05-13 RX ORDER — PANTOPRAZOLE SODIUM 40 MG/1
40 TABLET, DELAYED RELEASE ORAL DAILY PRN
Qty: 90 TABLET | Refills: 1 | Status: SHIPPED | OUTPATIENT
Start: 2024-05-13

## 2024-05-13 RX ORDER — EZETIMIBE 10 MG/1
10 TABLET ORAL DAILY
Qty: 90 TABLET | Refills: 1 | Status: SHIPPED | OUTPATIENT
Start: 2024-05-13

## 2024-05-13 RX ORDER — TRAZODONE HYDROCHLORIDE 100 MG/1
100 TABLET ORAL NIGHTLY PRN
Qty: 90 TABLET | Refills: 1 | Status: SHIPPED | OUTPATIENT
Start: 2024-05-13

## 2024-05-13 RX ORDER — SIMVASTATIN 40 MG/1
40 TABLET, FILM COATED ORAL NIGHTLY
Qty: 90 TABLET | Refills: 1 | Status: SHIPPED | OUTPATIENT
Start: 2024-05-13

## 2024-05-13 ASSESSMENT — ENCOUNTER SYMPTOMS
FEVER: 0
BRUISES/BLEEDS EASILY: 0
SHORTNESS OF BREATH: 0
NAUSEA: 0
SINUS PRESSURE: 0
DIARRHEA: 0
DYSURIA: 0
SLEEP DISTURBANCE: 0
ABDOMINAL PAIN: 0
ADENOPATHY: 0
ABDOMINAL DISTENTION: 0
FATIGUE: 0
CHEST TIGHTNESS: 0
ARTHRALGIAS: 0
HEADACHES: 0
CHILLS: 0
NERVOUS/ANXIOUS: 0
DYSPHORIC MOOD: 0
MYALGIAS: 0
APPETITE CHANGE: 0
WHEEZING: 0
DIFFICULTY URINATING: 0
LIGHT-HEADEDNESS: 0

## 2024-05-13 NOTE — PROGRESS NOTES
Subjective   Patient ID: Zara Motta is a 73 y.o. female who presents for Follow-up.    HPI     Review of Systems    Objective   /76   Pulse 68   Temp 36.2 °C (97.2 °F)   Resp 12   LMP  (LMP Unknown)     Physical Exam    Assessment/Plan

## 2024-05-13 NOTE — PROGRESS NOTES
Subjective   Patient ID: Zara Motta is a 73 y.o. female who presents for Follow-up.  Pt has Dyslipidemia.   Lipid panel showed LDL in good range..  Currently taking Simvastatin and Zetia and is tolerating well without muscle pains or weakness.     For OAB  taking Oxybutinin . Doing well and tolerating medication well. Frequency is not bothersome.     Pt has chronic and stable depression / anxiety.  Pt has supportive family/friends.   Pt not seeing a counselor.   Taking Cymbalta, Bupropion and Trazodone and it is helping.   Mood, energy, motivation, interests, sleep and appetite are adequate. Anxiety is stable.  Pt denies suicidal ideations.     GERD/ Barretts  is well controlled on Protonix . EGD was in 2023.  Pt is taking medication daily. Denies epigastric pain, nausea, heartburn or water brash.     She is doing well healing from bimaleolar fx in March. Doing home PT and following with Orthopedics.         Review of Systems   Constitutional:  Negative for appetite change, chills, fatigue and fever.   HENT:  Negative for congestion, ear pain and sinus pressure.    Eyes:  Negative for visual disturbance.   Respiratory:  Negative for chest tightness, shortness of breath and wheezing.    Cardiovascular:  Negative for chest pain.   Gastrointestinal:  Negative for abdominal distention, abdominal pain, diarrhea and nausea.   Genitourinary:  Negative for difficulty urinating, dysuria and pelvic pain.   Musculoskeletal:  Negative for arthralgias and myalgias.   Skin:  Negative for rash.   Allergic/Immunologic: Negative for immunocompromised state.   Neurological:  Negative for light-headedness and headaches.   Hematological:  Negative for adenopathy. Does not bruise/bleed easily.   Psychiatric/Behavioral:  Negative for dysphoric mood and sleep disturbance. The patient is not nervous/anxious.        Objective   /76   Pulse 68   Temp 36.2 °C (97.2 °F)   Resp 12   Wt 95.3 kg (210 lb)   LMP  (LMP Unknown)    BMI 33.89 kg/m²    Physical Exam  Constitutional:       General: She is not in acute distress.     Appearance: Normal appearance.   Cardiovascular:      Rate and Rhythm: Normal rate and regular rhythm.      Heart sounds: Normal heart sounds. No murmur heard.  Pulmonary:      Effort: Pulmonary effort is normal.      Breath sounds: Normal breath sounds.   Abdominal:      Palpations: Abdomen is soft.      Tenderness: There is no abdominal tenderness.   Neurological:      Mental Status: She is alert.   Psychiatric:         Mood and Affect: Mood normal.         Judgment: Judgment normal.           Assessment/Plan   Diagnoses and all orders for this visit:  Diastolic dysfunction, left ventricle - asymptomatic, monitor  Hx of deep venous thrombosis - continue Eliquis  Hyperlipidemia - Simvastatin and Zetia doing well, continue to monitor  Gastro-esophageal reflux disease/Machuca's esophagus/Hiatal hernia - doing well on Protonix, EGD current, sx controlled   OAB (overactive bladder) - doing well on OAB  Recurrent major depressive disorder/Primary insomnia -doing well on Cymbalta, Bupropion, and Trazodone  Weight - recommend low carb diet, increasing water intake to at least 64oz/day, healthy snacking between meals, and regular cardiovascular exercise 150mins/week. Goal for weight loss is 1-2# per week.     Follow up in 6 months, 30mins

## 2024-05-14 RX ORDER — SIMVASTATIN 40 MG/1
40 TABLET, FILM COATED ORAL NIGHTLY
Qty: 90 TABLET | Refills: 1 | OUTPATIENT
Start: 2024-05-14

## 2024-05-14 RX ORDER — TRAZODONE HYDROCHLORIDE 100 MG/1
100 TABLET ORAL NIGHTLY PRN
Qty: 90 TABLET | Refills: 1 | OUTPATIENT
Start: 2024-05-14

## 2024-05-15 ENCOUNTER — HOME CARE VISIT (OUTPATIENT)
Dept: HOME HEALTH SERVICES | Facility: HOME HEALTH | Age: 74
End: 2024-05-15
Payer: MEDICARE

## 2024-05-15 ENCOUNTER — APPOINTMENT (OUTPATIENT)
Dept: CARDIOLOGY | Facility: CLINIC | Age: 74
End: 2024-05-15
Payer: MEDICARE

## 2024-05-15 PROCEDURE — G0157 HHC PT ASSISTANT EA 15: HCPCS | Mod: CQ

## 2024-05-15 PROCEDURE — G0180 MD CERTIFICATION HHA PATIENT: HCPCS | Performed by: FAMILY MEDICINE

## 2024-05-20 ENCOUNTER — HOME CARE VISIT (OUTPATIENT)
Dept: HOME HEALTH SERVICES | Facility: HOME HEALTH | Age: 74
End: 2024-05-20
Payer: MEDICARE

## 2024-05-20 PROCEDURE — G0157 HHC PT ASSISTANT EA 15: HCPCS | Mod: CQ

## 2024-05-22 ENCOUNTER — HOME CARE VISIT (OUTPATIENT)
Dept: HOME HEALTH SERVICES | Facility: HOME HEALTH | Age: 74
End: 2024-05-22
Payer: MEDICARE

## 2024-05-22 PROCEDURE — G0157 HHC PT ASSISTANT EA 15: HCPCS | Mod: CQ

## 2024-05-28 ENCOUNTER — HOME CARE VISIT (OUTPATIENT)
Dept: HOME HEALTH SERVICES | Facility: HOME HEALTH | Age: 74
End: 2024-05-28
Payer: MEDICARE

## 2024-05-28 ENCOUNTER — TRANSCRIBE ORDERS (OUTPATIENT)
Dept: PHYSICAL THERAPY | Facility: CLINIC | Age: 74
End: 2024-05-28

## 2024-05-28 DIAGNOSIS — M79.673 PAIN, FOOT: Primary | ICD-10-CM

## 2024-05-28 PROCEDURE — G0157 HHC PT ASSISTANT EA 15: HCPCS | Mod: CQ

## 2024-05-29 ENCOUNTER — OFFICE VISIT (OUTPATIENT)
Dept: CARDIOLOGY | Facility: CLINIC | Age: 74
End: 2024-05-29
Payer: MEDICARE

## 2024-05-29 VITALS
WEIGHT: 207 LBS | SYSTOLIC BLOOD PRESSURE: 139 MMHG | RESPIRATION RATE: 16 BRPM | DIASTOLIC BLOOD PRESSURE: 93 MMHG | HEIGHT: 66 IN | HEART RATE: 75 BPM | BODY MASS INDEX: 33.27 KG/M2

## 2024-05-29 DIAGNOSIS — I82.403 RECURRENT DEEP VEIN THROMBOSIS (DVT) OF BOTH LOWER EXTREMITIES (MULTI): ICD-10-CM

## 2024-05-29 DIAGNOSIS — Z79.01 CHRONIC ANTICOAGULATION: ICD-10-CM

## 2024-05-29 DIAGNOSIS — I87.023 POSTTHROMBOTIC SYNDROME OF BOTH LOWER EXTREMITIES WITH INFLAMMATION: ICD-10-CM

## 2024-05-29 PROCEDURE — 1125F AMNT PAIN NOTED PAIN PRSNT: CPT | Performed by: INTERNAL MEDICINE

## 2024-05-29 PROCEDURE — 1160F RVW MEDS BY RX/DR IN RCRD: CPT | Performed by: INTERNAL MEDICINE

## 2024-05-29 PROCEDURE — 99204 OFFICE O/P NEW MOD 45 MIN: CPT | Performed by: INTERNAL MEDICINE

## 2024-05-29 PROCEDURE — 1123F ACP DISCUSS/DSCN MKR DOCD: CPT | Performed by: INTERNAL MEDICINE

## 2024-05-29 PROCEDURE — 1159F MED LIST DOCD IN RCRD: CPT | Performed by: INTERNAL MEDICINE

## 2024-05-29 PROCEDURE — 1036F TOBACCO NON-USER: CPT | Performed by: INTERNAL MEDICINE

## 2024-05-29 PROCEDURE — 99214 OFFICE O/P EST MOD 30 MIN: CPT | Performed by: INTERNAL MEDICINE

## 2024-05-29 PROCEDURE — 3008F BODY MASS INDEX DOCD: CPT | Performed by: INTERNAL MEDICINE

## 2024-05-29 ASSESSMENT — COLUMBIA-SUICIDE SEVERITY RATING SCALE - C-SSRS
6. HAVE YOU EVER DONE ANYTHING, STARTED TO DO ANYTHING, OR PREPARED TO DO ANYTHING TO END YOUR LIFE?: NO
1. IN THE PAST MONTH, HAVE YOU WISHED YOU WERE DEAD OR WISHED YOU COULD GO TO SLEEP AND NOT WAKE UP?: NO
2. HAVE YOU ACTUALLY HAD ANY THOUGHTS OF KILLING YOURSELF?: NO

## 2024-05-29 ASSESSMENT — PAIN SCALES - GENERAL: PAINLEVEL: 6

## 2024-05-29 ASSESSMENT — PATIENT HEALTH QUESTIONNAIRE - PHQ9
SUM OF ALL RESPONSES TO PHQ9 QUESTIONS 1 AND 2: 0
1. LITTLE INTEREST OR PLEASURE IN DOING THINGS: NOT AT ALL
2. FEELING DOWN, DEPRESSED OR HOPELESS: NOT AT ALL

## 2024-05-29 ASSESSMENT — ENCOUNTER SYMPTOMS: OCCASIONAL FEELINGS OF UNSTEADINESS: 1

## 2024-05-29 NOTE — PROGRESS NOTES
"OUTPATIENT CONSULTATION -  VASCULAR MEDICINE    DOS: 5/29/24    REQUESTING PHYSICIAN:  Dr. Ha Damian, PCP; self referred     REASON FOR CONSULT:  told to follow up with vascular doctor after a recent blood clot    HISTORY OF PRESENT ILLNESS:     74 yo lady told to follow up with vascular doctor after a recent blood clot. Reports was planning on follow up for left foot swelling - then recent hx complicated by an ankle fx. Left foot started swelling 1 year +/-. Since starting - this is worse - relates this to not walking and being NWB right. Reports her \"regular compression will not fit on her feet bc of swelling. Reports uses compression she purchases on [employer] but does not know the grade. These were last used about a month ago before she broke her foot. PT provided light compression from a role - this is helping \"a little bit\" using a single layer of compression. Sleeps in bed - foot of the bed is not elevated. Did sleep in a chair for a month - could not get up stairs. No sores or ulcer related to the swelling.    Also asking about an IVC filter - \"to be off the blood thinners\". Reports AC was switched to eliquis when she had her hip replacement - this was done by a hospitalist by report. Has issues at the end of the year with cost of the eliquis at 150$ a month. 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 Rx warfarin x 6 months. Acute DVT RLE 2002 - also idiopathic - was on warfarin and planned for indefinite therapy. Did well on warfarin without bleeding. Was doing home POCT. Had bunny TKA in 2012 and this was complicated by LLE DVT. Warfarin was maintained. Has had bunny MINERVA - left approx 10 years ago. Right MINERVA - 2022 - no VTE but AC was changed at that time to eliquis as noted. Foot and ankle fx 3/2024. Underwent ORIF - AC was not resumed post-op -  did not have SCDs, no lovenox or UFH - reports AC held for approx 6 days. Dx with RLE calf vein DVT approx 2 weeks " after surgery - was back on AC at that time. By prev reports - has had thrombophilia testing and this was normal - no records available.    PMH/PSH:    PE, RLE DVT, LLE DVT and RLE DVT - as noted above  HPL  OA  Radiculopathy/DDD  Brett TKA  Brett MINERVA  Right ankle ORIF  GERD  Depression  Varicose veins  Tendonitis  obesity    FAMILY HISTORY:     No VTE  No CTD  No AAA    SOCIAL HISTORY:     Tobacco never  Employment ret nurse    REVIEW OF SYSTEMS:     No fevers, +chills, weight is increased  No sores, ulcers, rashes, skin lesions  No HA, SZ, syncope, stroke, TIA  No CP,  chest pressure  No cough, SOB  No ABD pain  No BRBPR, melena, hematuria  No bleeding  + edema, no calf pain  No ambulatory leg pain  + paraesthesias - right foot  UTD mammo, pap, colonoscopy    PHYSICAL EXAMINATION:   Gen: Appears well, NAD  HEENT: WNL  No carotid bruits  Chest: CTA  CVS: regular without murmur or gallop  Ext: 1-2+ brawny edema brett, nontender  Skin: LDS and chronic stasis changes brett  Pulses: DP 2+;   Neuro: grossly normal, CN intact, JOHNNIE x 4  Mood and affect appropriate    ADDITIONAL DATA:   no compression worn  right calf: 46.0cm   left calf:  45.0cm     ASSESSMENT/PLAN:  told to follow up with vascular doctor after a recent blood clot - recurrent VTE as noted. Discussed no role for an IVC filter. Will be on AC indefinitely. Discussed RBA to eliquis. She prefers to stay on eliquis at this time. Discussed role of VI imaging - VI scan ordered - follow up 3 months. Rx edemawear and tubigrip now for compression. Elevate the foot of the bed with bricks. Walk 3 minutes every hour. Follow up 3 months.

## 2024-05-29 NOTE — PATIENT INSTRUCTIONS
ASSESSMENT/PLAN:  told to follow up with vascular doctor after a recent blood clot - recurrent VTE as noted. Discussed no role for an IVC filter. Will be on AC indefinitely. Discussed RBA to eliquis. She prefers to stay on eliquis at this time. Discussed role of VI imaging - VI scan ordered - follow up 3 months. Rx edemawear and tubigrip now for compression. Elevate the foot of the bed with bricks. Walk 3 minutes every hour. Follow up 3 months.

## 2024-05-30 ENCOUNTER — HOME CARE VISIT (OUTPATIENT)
Dept: HOME HEALTH SERVICES | Facility: HOME HEALTH | Age: 74
End: 2024-05-30
Payer: MEDICARE

## 2024-05-30 PROCEDURE — G0157 HHC PT ASSISTANT EA 15: HCPCS | Mod: CQ

## 2024-05-31 ENCOUNTER — EVALUATION (OUTPATIENT)
Dept: PHYSICAL THERAPY | Facility: CLINIC | Age: 74
End: 2024-05-31
Payer: MEDICARE

## 2024-05-31 DIAGNOSIS — M79.673 PAIN, FOOT: ICD-10-CM

## 2024-05-31 PROCEDURE — 97161 PT EVAL LOW COMPLEX 20 MIN: CPT | Mod: GP | Performed by: PHYSICAL THERAPIST

## 2024-05-31 PROCEDURE — 97110 THERAPEUTIC EXERCISES: CPT | Mod: GP | Performed by: PHYSICAL THERAPIST

## 2024-05-31 ASSESSMENT — ENCOUNTER SYMPTOMS
LOSS OF SENSATION IN FEET: 1
OCCASIONAL FEELINGS OF UNSTEADINESS: 1
DEPRESSION: 0

## 2024-05-31 ASSESSMENT — PATIENT HEALTH QUESTIONNAIRE - PHQ9
2. FEELING DOWN, DEPRESSED OR HOPELESS: NOT AT ALL
SUM OF ALL RESPONSES TO PHQ9 QUESTIONS 1 AND 2: 0
1. LITTLE INTEREST OR PLEASURE IN DOING THINGS: NOT AT ALL

## 2024-05-31 NOTE — PROGRESS NOTES
Physical Therapy Evaluation    Patient Name: Zara Motta  MRN: [id number]  Today's Date: 5/31/2024  Visit: 1/mn  DOS/DOI:    March 14, 2024  Referred by:   Corwin Lou  Time Calculation  Start Time: 1010  Stop Time: 1052  Time Calculation (min): 42 min  PT Evaluation Time Entry  PT Evaluation (Low) Time Entry: 30  PT Therapeutic Procedures Time Entry  Therapeutic Exercise Time Entry: 12                 Diagnosis:   1. Pain, foot  Referral to Physical Therapy    Follow Up In Physical Therapy              PMH  - L MINERVA, B TKA  - OA, anemia, UTI    HPI  On March 13, 2024 she fell in the shower and had immed pain.  She went to the ER and a x-ray was done which was positive a R distal fib and lisfranc fx.  She had surgery the next day in which a plate was placed at the fib and 2 screws were placed (1 across the syndesmosis and 1 at the medial mid foot).  Fixation of the 4th/5th TMT joint also done.  She was in a cast for 7 weeks and was NWB in a wc.  When the cast was removed she had the pins removed as well.  She did not have a boot or surgical shoe.  She started HH PT.  She did well and progressed to the point where she could attend outpatient PT.    Precautions  - WB, AROM and strength as meliton    SUBJECTIVE  Symptoms:  - she reports pain at the anterior, dorsal and lateral R foot.  She also has pain at the medial arch.  She has decreased ROM of the ankle.  It is rated 8/10 and described as a throbbing ache.  It increases with WB and any movement.  It decreases with NWB positions and compression.  She hasn't tried ice.  Functionally she is limited in walking (500 ft) to grocery shop, dressing/showering (uses shower chair), stairs (uses step to gait), and driving.    Patient's Living Environment:  - 2 story home.  2nd floor bedroom and bathroom  - lives with .  Daughter lives close by    Previous Level of Function:  - no problems prior to March 13, 2024    Patient's Therapy Goals:  - walk  normally    OBJECTIVE  Observation:    - note edema in the R foot and ankle  - amb with a SPC in the L hand.  Lacks terminals stance due to lack of DF  - incisions well healed  - ankle pro B    Palpation:   - tender at the R ant and lateral foot  - pitting edema present    AROM:  - AROM R ankle DF = -2 with pain, PF = 38 with pain, INV = 8 with pain, EVR = 5 with pain.  - lacks toe flex #1-5    MMT:  - R ankle = 4/5 all dir    Special Tests:  - NT    Joint Mobility:   - Decreased talocrual and subtalar joint mobility all dir.    Outcome Measure:  - LEFS = 22    ASSESSMENT  The patient is a 72 y/o female presenting to PT s/p surgery to stabilize the R ankle and foot after a dist fib and lisfranc fx.  She has significant edema in the R foot and loss of ROM/strength as well as decreased WB.  This alters her gait and decreases her ability to walk, climb stairs, drive and do WB ADLS such a showering and dressing.  She will benefit from PT to improve these deficits for a return to ADLS.  Rehab potential:  good  Clinical presentation:  Stable and/or uncomplicated characteristics       PLAN  Frequency/duration:  2x/wk x 10 wks  Planned Interventions:  MT, therapeutic exercise, neuro re-ed, modalities prn  Patient/caregiver agrees with POC:  yes    TODAY'S TREATMENT  - evaluation of the R foot/ankle completed.  She was educated on her dx and foot anatomy.  She was instructed to purchase a compression stocking and to ice her foot for 15-20 min several times a day to reduce swelling.  It was also recommended she elevate her legs.  - she was given a HEP as seen below:    Access Code: [email or web address]  URL: [email or web address]  Date: 05/31/2024  Prepared by: Dina Mishra    Exercises  - Long Sitting Calf Stretch with Strap  - 1-2 x daily - 7 x weekly - 3 sets - 10 reps  - Supine Ankle Circles  - 1-2 x daily - 7 x weekly - 3 sets - 10 reps  - Seated Toe Towel Scrunches  - 1-2 x daily - 7 x weekly - 3 sets - 10  reps  - Supine Ankle Inversion and Eversion AROM  - 1-2 x daily - 7 x weekly - 3 sets - 10 reps    Goals:   1.  she will be independent with her HEP  2.  achieve full AROM in all dir of the R ankle so she can amb with a normal gait  3.  achieve full WB of the R LE without pain to facilitate normal gait  4.  reduce swelling of the R foot and ankle so there is only a 2-3 cm difference between the R and L sides  5.  achieve 5/5 strength for climbing stair

## 2024-06-03 ASSESSMENT — ACTIVITIES OF DAILY LIVING (ADL)
HOME_HEALTH_OASIS: 01
OASIS_M1830: 01

## 2024-06-04 ENCOUNTER — TREATMENT (OUTPATIENT)
Dept: PHYSICAL THERAPY | Facility: CLINIC | Age: 74
End: 2024-06-04
Payer: MEDICARE

## 2024-06-04 DIAGNOSIS — M79.673 PAIN, FOOT: ICD-10-CM

## 2024-06-04 PROCEDURE — 97110 THERAPEUTIC EXERCISES: CPT | Mod: GP | Performed by: PHYSICAL THERAPIST

## 2024-06-04 PROCEDURE — 97140 MANUAL THERAPY 1/> REGIONS: CPT | Mod: GP | Performed by: PHYSICAL THERAPIST

## 2024-06-04 NOTE — LETTER
June 4, 2024    Corwin Lou MD  [address]  [facility] [address]    Patient: Zara Motta   YOB: 1950   Date of Visit: 6/4/2024       Dear Corwin Lou MD  [address]  [place] [address]    The attached plan of care is being sent to you because your patient’s medical reimbursement requires that you certify the plan of care. Your signature is required to allow uninterrupted insurance coverage.      You may indicate your approval by signing below and faxing this form back to us at Dept Fax: [phone].    Please call Dept: [phone] with any questions or concerns.    Thank you for this referral,        Dina Mishra, PT  [facility] [address] [facility]  [address] [facility] [address]    Payer: Payor: [employer] MEDICARE / Plan: [employer] MEDICARE / Product Type: *No Product type* /                                                                         Date:     Dear Dina Mishra, PT,     Re: Ms. Zara Motta, MRN:[id number]    I certify that I have reviewed the attached plan of care and it is medically necessary for Ms. Zara Motta (1950) who is under my care.          ______________________________________                    _________________  Provider name and credentials                                           Date and time                                                                                           Plan of Care 5/31/24   Effective from: 5/31/2024  Effective to: 7/31/2024    Plan ID: [id number]            Participants as of Finalize on 6/4/2024    Name Type Comments Contact Info    Corwin Lou MD Referring Provider  [phone]    Dina Mishra, PT Consulting Physician  [phone]       Last Plan Note     Author: Dina Mishra, PT Status: Sign when Signing Visit Last edited: 6/4/2024 10:00 AM         Physical Therapy Treatment    Patient Name: Zara Motta  MRN:  [id number]  Today's Date: 6/4/2024  Visit 2/mn  DOI/DOS:  3/14/24  Medicare Cert Dates:  5/31/24 - 7/31/24  Referred by:   Corwin Lou  Time Calculation  Start Time: 1004  Stop Time: 1058  Time Calculation (min): 54 min     PT Therapeutic Procedures Time Entry  Manual Therapy Time Entry: 18  Therapeutic Exercise Time Entry: 26  PT Modalities Time Entry  Hot/Cold Pack Time Entry: 10              Diagnosis:   1. Pain, foot  Follow Up In Physical Therapy          SUBJECTIVE:  She reports pain at the dorsal and lateral aspect of her R foot rated 7/10.  HEP compliance: yes    OBJECTIVE:  - amb with a SPC.  Note step to gait as she cannot DF to get into terminal stance position.  - R ankle AROM DF = 0, PF = 40, INV = 5, EVR = 10  - note decreased edema in the R foot.    Treatment:  - initiated exercise for WB, ROM and muscle activation.  Therapeutic Exercise  Therapeutic Exercise Activity 1: AROM INV/EVR, DF/PF 2x15  Therapeutic Exercise Activity 2: DF on step, 4 in, 2x10  Therapeutic Exercise Activity 3: SLS R 30 sec x2  Therapeutic Exercise Activity 5: R ankle isometri 2x10cs  Therapeutic Exercise Activity 6: slant board stretch, 15 sec, x3  Manual Therapy  Manual Therapy Activity 1: TFM to lateral incision  Manual Therapy Activity 2: massage to reduce swelling  Manual Therapy Activity 3: mobs for DF and INV/EVR        Modalities  Modality 1: Untimed Cold Pack (CP to the R foot after exercise 10 min in supine)    ASSESSMENT:  Good meliton to exercise.  She demonstrates increased AROM and with cues has improved gait with each foot coming forward past the other to some degree vs a step to gait.  She will benefit from continued therapy to improve swelling, WB, ROM and strength for return to amb with normal gait, stair climbing and driving.    PLAN:  Increase exercise as meliton.  Add BAPS board.           Current Participants as of 6/4/2024    Name Type Comments Contact Info    Corwin Lou MD Referring Provider   [phone]    Signature pending    Dina Mishra, PT Consulting Physician  [phone]

## 2024-06-04 NOTE — PROGRESS NOTES
Physical Therapy Treatment    Patient Name: Zara Motta  MRN: [id number]  Today's Date: 6/4/2024  Visit 2/mn  DOI/DOS:  3/14/24  Medicare Cert Dates:  5/31/24 - 7/31/24  Referred by:   Corwin Lou  Time Calculation  Start Time: 1004  Stop Time: 1058  Time Calculation (min): 54 min     PT Therapeutic Procedures Time Entry  Manual Therapy Time Entry: 18  Therapeutic Exercise Time Entry: 26  PT Modalities Time Entry  Hot/Cold Pack Time Entry: 10              Diagnosis:   1. Pain, foot  Follow Up In Physical Therapy          SUBJECTIVE:  She reports pain at the dorsal and lateral aspect of her R foot rated 7/10.  HEP compliance: yes    OBJECTIVE:  - amb with a SPC.  Note step to gait as she cannot DF to get into terminal stance position.  - R ankle AROM DF = 0, PF = 40, INV = 5, EVR = 10  - note decreased edema in the R foot.    Treatment:  - initiated exercise for WB, ROM and muscle activation.  Therapeutic Exercise  Therapeutic Exercise Activity 1: AROM INV/EVR, DF/PF 2x15  Therapeutic Exercise Activity 2: DF on step, 4 in, 2x10  Therapeutic Exercise Activity 3: SLS R 30 sec x2  Therapeutic Exercise Activity 5: R ankle isometri 2x10cs  Therapeutic Exercise Activity 6: slant board stretch, 15 sec, x3  Manual Therapy  Manual Therapy Activity 1: TFM to lateral incision  Manual Therapy Activity 2: massage to reduce swelling  Manual Therapy Activity 3: mobs for DF and INV/EVR        Modalities  Modality 1: Untimed Cold Pack (CP to the R foot after exercise 10 min in supine)    ASSESSMENT:  Good meliton to exercise.  She demonstrates increased AROM and with cues has improved gait with each foot coming forward past the other to some degree vs a step to gait.  She will benefit from continued therapy to improve swelling, WB, ROM and strength for return to amb with normal gait, stair climbing and driving.    PLAN:  Increase exercise as meliton.  Add BAPS board.

## 2024-06-04 NOTE — LETTER
June 4, 2024    Corwin Lou MD  [address]  [facility] [address]    Patient: Zara Motta   YOB: 1950   Date of Visit: 6/4/2024       Dear Corwin Lou MD  [address]  [place] [address]    The attached plan of care is being sent to you because your patient’s medical reimbursement requires that you certify the plan of care. Your signature is required to allow uninterrupted insurance coverage.      You may indicate your approval by signing below and faxing this form back to us at Dept Fax: [phone].    Please call Dept: [phone] with any questions or concerns.    Thank you for this referral,        Dina Mishra, PT  [facility] [address] [facility]  [address] [facility] [address]    Payer: Payor: [employer] MEDICARE / Plan: [employer] HEALTH[employer] MEDICARE / Product Type: *No Product type* /                                                                         Date:     Dear Dina Mishra, PT,     Re: Ms. Zara Motta, MRN:[id number]    I certify that I have reviewed the attached plan of care and it is medically necessary for Ms. Zara Motta (1950) who is under my care.          ______________________________________                    _________________  Provider name and credentials                                           Date and time                                                                                      The following plan is in draft form.  Please refer to the current version for the most up-to-date information.                Plan of Care 5/31/24   Effective from: 5/31/2024  Effective to: 7/31/2024    Draft  Plan ID: [id number]               Participants as of 6/4/2024      Name Type Comments Contact Info    Corwin Lou MD Referring Provider  [phone]    Dina Mishra, PT Consulting Physician  [phone]          Last Plan Note       Author: Dina Mishar PT Status: Sign when  Signing Visit Last edited: 6/4/2024 10:00 AM           Physical Therapy Treatment    Patient Name: Zara Motta  MRN: [id number]  Today's Date: 6/4/2024  Visit 2/mn  DOI/DOS:  3/14/24  Medicare Cert Dates:  5/31/24 - 7/31/24  Referred by:   Corwin Lou  Time Calculation  Start Time: 1004  Stop Time: 1058  Time Calculation (min): 54 min     PT Therapeutic Procedures Time Entry  Manual Therapy Time Entry: 18  Therapeutic Exercise Time Entry: 26  PT Modalities Time Entry  Hot/Cold Pack Time Entry: 10              Diagnosis:   1. Pain, foot  Follow Up In Physical Therapy          SUBJECTIVE:  She reports pain at the dorsal and lateral aspect of her R foot rated 7/10.  HEP compliance: yes    OBJECTIVE:  - amb with a SPC.  Note step to gait as she cannot DF to get into terminal stance position.  - R ankle AROM DF = 0, PF = 40, INV = 5, EVR = 10  - note decreased edema in the R foot.    Treatment:  - initiated exercise for WB, ROM and muscle activation.  Therapeutic Exercise  Therapeutic Exercise Activity 1: AROM INV/EVR, DF/PF 2x15  Therapeutic Exercise Activity 2: DF on step, 4 in, 2x10  Therapeutic Exercise Activity 3: SLS R 30 sec x2  Therapeutic Exercise Activity 5: R ankle isometri 2x10cs  Therapeutic Exercise Activity 6: slant board stretch, 15 sec, x3  Manual Therapy  Manual Therapy Activity 1: TFM to lateral incision  Manual Therapy Activity 2: massage to reduce swelling  Manual Therapy Activity 3: mobs for DF and INV/EVR        Modalities  Modality 1: Untimed Cold Pack (CP to the R foot after exercise 10 min in supine)    ASSESSMENT:  Good meliton to exercise.  She demonstrates increased AROM and with cues has improved gait with each foot coming forward past the other to some degree vs a step to gait.  She will benefit from continued therapy to improve swelling, WB, ROM and strength for return to amb with normal gait, stair climbing and driving.    PLAN:  Increase exercise as meliton.  Add BAPS board.          none

## 2024-06-05 ENCOUNTER — HOSPITAL ENCOUNTER (OUTPATIENT)
Dept: VASCULAR MEDICINE | Facility: CLINIC | Age: 74
Discharge: HOME | End: 2024-06-05
Payer: MEDICARE

## 2024-06-05 DIAGNOSIS — I82.403 RECURRENT DEEP VEIN THROMBOSIS (DVT) OF BOTH LOWER EXTREMITIES (MULTI): ICD-10-CM

## 2024-06-05 DIAGNOSIS — I87.023 POSTTHROMBOTIC SYNDROME OF BOTH LOWER EXTREMITIES WITH INFLAMMATION: ICD-10-CM

## 2024-06-05 DIAGNOSIS — I83.813 VARICOSE VEINS OF BILATERAL LOWER EXTREMITIES WITH PAIN: ICD-10-CM

## 2024-06-05 PROCEDURE — 93970 EXTREMITY STUDY: CPT

## 2024-06-05 PROCEDURE — 93970 EXTREMITY STUDY: CPT | Performed by: INTERNAL MEDICINE

## 2024-06-07 ENCOUNTER — APPOINTMENT (OUTPATIENT)
Dept: PHYSICAL THERAPY | Facility: CLINIC | Age: 74
End: 2024-06-07
Payer: MEDICARE

## 2024-06-12 ENCOUNTER — APPOINTMENT (OUTPATIENT)
Dept: PHYSICAL THERAPY | Facility: CLINIC | Age: 74
End: 2024-06-12
Payer: MEDICARE

## 2024-06-14 ENCOUNTER — APPOINTMENT (OUTPATIENT)
Dept: PHYSICAL THERAPY | Facility: CLINIC | Age: 74
End: 2024-06-14
Payer: MEDICARE

## 2024-06-17 ENCOUNTER — APPOINTMENT (OUTPATIENT)
Dept: PHYSICAL THERAPY | Facility: CLINIC | Age: 74
End: 2024-06-17
Payer: MEDICARE

## 2024-06-19 ENCOUNTER — APPOINTMENT (OUTPATIENT)
Dept: PHYSICAL THERAPY | Facility: CLINIC | Age: 74
End: 2024-06-19
Payer: MEDICARE

## 2024-06-21 ENCOUNTER — LAB (OUTPATIENT)
Dept: LAB | Facility: LAB | Age: 74
End: 2024-06-21
Payer: MEDICARE

## 2024-06-21 DIAGNOSIS — D64.9 ANEMIA, UNSPECIFIED TYPE: ICD-10-CM

## 2024-06-21 LAB
BASOPHILS # BLD AUTO: 0.04 X10*3/UL (ref 0–0.1)
BASOPHILS NFR BLD AUTO: 0.9 %
EOSINOPHIL # BLD AUTO: 0.08 X10*3/UL (ref 0–0.4)
EOSINOPHIL NFR BLD AUTO: 1.8 %
ERYTHROCYTE [DISTWIDTH] IN BLOOD BY AUTOMATED COUNT: 15 % (ref 11.5–14.5)
FERRITIN SERPL-MCNC: 29 NG/ML (ref 8–150)
FOLATE SERPL-MCNC: >24 NG/ML
HCT VFR BLD AUTO: 36 % (ref 36–46)
HGB BLD-MCNC: 11.1 G/DL (ref 12–16)
IMM GRANULOCYTES # BLD AUTO: 0.01 X10*3/UL (ref 0–0.5)
IMM GRANULOCYTES NFR BLD AUTO: 0.2 % (ref 0–0.9)
IRON SATN MFR SERPL: 14 % (ref 25–45)
IRON SERPL-MCNC: 54 UG/DL (ref 35–150)
LYMPHOCYTES # BLD AUTO: 1.23 X10*3/UL (ref 0.8–3)
LYMPHOCYTES NFR BLD AUTO: 28.1 %
MCH RBC QN AUTO: 26.6 PG (ref 26–34)
MCHC RBC AUTO-ENTMCNC: 30.8 G/DL (ref 32–36)
MCV RBC AUTO: 86 FL (ref 80–100)
MONOCYTES # BLD AUTO: 0.55 X10*3/UL (ref 0.05–0.8)
MONOCYTES NFR BLD AUTO: 12.6 %
NEUTROPHILS # BLD AUTO: 2.46 X10*3/UL (ref 1.6–5.5)
NEUTROPHILS NFR BLD AUTO: 56.4 %
NRBC BLD-RTO: 0 /100 WBCS (ref 0–0)
PLATELET # BLD AUTO: 244 X10*3/UL (ref 150–450)
RBC # BLD AUTO: 4.17 X10*6/UL (ref 4–5.2)
TIBC SERPL-MCNC: 391 UG/DL (ref 240–445)
UIBC SERPL-MCNC: 337 UG/DL (ref 110–370)
VIT B12 SERPL-MCNC: 435 PG/ML (ref 211–911)
WBC # BLD AUTO: 4.4 X10*3/UL (ref 4.4–11.3)

## 2024-06-21 PROCEDURE — 82607 VITAMIN B-12: CPT

## 2024-06-21 PROCEDURE — 36415 COLL VENOUS BLD VENIPUNCTURE: CPT

## 2024-06-21 PROCEDURE — 85025 COMPLETE CBC W/AUTO DIFF WBC: CPT

## 2024-06-21 PROCEDURE — 82746 ASSAY OF FOLIC ACID SERUM: CPT

## 2024-06-21 PROCEDURE — 83540 ASSAY OF IRON: CPT

## 2024-06-21 PROCEDURE — 82728 ASSAY OF FERRITIN: CPT

## 2024-06-21 PROCEDURE — 83550 IRON BINDING TEST: CPT

## 2024-06-24 ENCOUNTER — TREATMENT (OUTPATIENT)
Dept: PHYSICAL THERAPY | Facility: CLINIC | Age: 74
End: 2024-06-24
Payer: MEDICARE

## 2024-06-24 DIAGNOSIS — M79.673 PAIN, FOOT: ICD-10-CM

## 2024-06-24 PROCEDURE — 97140 MANUAL THERAPY 1/> REGIONS: CPT | Mod: GP | Performed by: PHYSICAL THERAPIST

## 2024-06-24 PROCEDURE — 97110 THERAPEUTIC EXERCISES: CPT | Mod: GP | Performed by: PHYSICAL THERAPIST

## 2024-06-24 NOTE — PROGRESS NOTES
Physical Therapy Treatment    Patient Name: Zara Motta  MRN: [id number]  Today's Date: 6/24/2024  Visit 3/mn  DOI/DOS:  3/14/24  Medicare Cert Dates:  5/31/24 - 7/31/24  Referred by:   Corwin Lou  Time Calculation  Start Time: 1100  Stop Time: 1143  Time Calculation (min): 43 min     PT Therapeutic Procedures Time Entry  Manual Therapy Time Entry: 10  Therapeutic Exercise Time Entry: 33                 Diagnosis:   1. Pain, foot  Follow Up In Physical Therapy          SUBJECTIVE:  She reports 5/10 pain at the dorsum and lateral aspect of the R foot.  Her AROM has improved as has the swelling.  HEP compliance: yes    OBJECTIVE:  - R ankle AROM DF = 2, PF = 42, INV = 10, EVR = 20  - R ankle MMT = 4+/5 all dir.  Some pain with EVR  - note decreased edema and thickness of the lateral incision  - lacks terminal phase of gait due to decreased DF.  Moves laterally vs forward after midstance.    Treatment:  - patient arrived 10 min late.  Completed rx as time allowed.  - completed exercise for ROM, WB and gait.  Added BAPS board.  Therapeutic Exercise  Therapeutic Exercise Activity 1: 4-way ankle, GN TB,2 x10  Therapeutic Exercise Activity 2: DF on step, 6 in, 2x10  Therapeutic Exercise Activity 3: SLS R 30 sec x2  Therapeutic Exercise Activity 4: BAPS, L3, DF/PF, INV/EVR, cw/ccw x10  Therapeutic Exercise Activity 6: slant board stretch, 30 sec x 2  Manual Therapy  Manual Therapy Activity 2: massage to reduce swelling  Manual Therapy Activity 3: DF mobs             ASSESSMENT:  Good meliton of exercise.  Demonstrates improved AROM and strength as well as swelling and scar tissue mobility.  With cueing her gait improved but is still altered as she lacks full active DF.  She will benefit from continued therapy to further improve AROM into DF to restore normal gait.    PLAN:  Add step on/over airex with R foot on airex.

## 2024-06-26 ENCOUNTER — TREATMENT (OUTPATIENT)
Dept: PHYSICAL THERAPY | Facility: CLINIC | Age: 74
End: 2024-06-26
Payer: MEDICARE

## 2024-06-26 DIAGNOSIS — M79.673 PAIN, FOOT: ICD-10-CM

## 2024-06-26 PROCEDURE — 97110 THERAPEUTIC EXERCISES: CPT | Mod: GP | Performed by: PHYSICAL THERAPIST

## 2024-06-26 NOTE — PROGRESS NOTES
"  Physical Therapy Treatment    Patient Name: Zara Motta  MRN: [id number]  Today's Date: 6/26/2024  Visit 4/mn  DOI/DOS:  3/14/24  Medicare Cert Dates:  5/31/24 - 7/31/24  Referred by:   Corwin Lou  Time Calculation  Start Time: 1004  Stop Time: 1055  Time Calculation (min): 51 min     PT Therapeutic Procedures Time Entry  Manual Therapy Time Entry: 10  Therapeutic Exercise Time Entry: 41                 Diagnosis:   1. Pain, foot  Follow Up In Physical Therapy            PRECAUTIONS:  none    SUBJECTIVE:  She reports a \"little bit of pain\" rated 5/10 at the dorsum and lateral aspect of her foot.  She was a little stiff after her last session.  HEP compliance: yes    OBJECTIVE:  - AROM DF = 2 (10 on L), PF = 43, INV = 12, EVR = 18  -  continues to amb with SPC.  Without a cane she is not able to take a full stride with her L LE in order to get her foot in front of the R LE.  - decreased edema noted    Treatment:  - continued with exercise to gain AROM of the ankle to facilitate motion needed to walk with a normal gait.  Therapeutic Exercise  Therapeutic Exercise Activity 1: 4-way ankle, GN TB, 3x10  Therapeutic Exercise Activity 2: DF on step, 6 in, 2x10  Therapeutic Exercise Activity 4: BAPS, L3, DF/PF, INV/EVR, cw/ccw x10  Therapeutic Exercise Activity 5: airex step over with R foot on pad, 2x12  Therapeutic Exercise Activity 6: slant board stretch, 30 sec x 2  Manual Therapy  Manual Therapy Activity 2: PROM DF and INV  Manual Therapy Activity 3: DF mobs             ASSESSMENT:  Isamar rx well.  She demonstrates AROM DF = 3 after manual therapy and edema is reduced.  She is progressing well and will benefit from continued therapy to improve AROM for stairs, amb, and driving.    PLAN:  Add toe up    "

## 2024-07-09 ENCOUNTER — TREATMENT (OUTPATIENT)
Dept: PHYSICAL THERAPY | Facility: CLINIC | Age: 74
End: 2024-07-09
Payer: MEDICARE

## 2024-07-09 DIAGNOSIS — M79.673 PAIN, FOOT: ICD-10-CM

## 2024-07-09 PROCEDURE — 97140 MANUAL THERAPY 1/> REGIONS: CPT | Mod: GP | Performed by: PHYSICAL THERAPIST

## 2024-07-09 PROCEDURE — 97110 THERAPEUTIC EXERCISES: CPT | Mod: GP | Performed by: PHYSICAL THERAPIST

## 2024-07-09 NOTE — PROGRESS NOTES
Physical Therapy Treatment    Patient Name: Zara Motta  MRN: [id number]  Today's Date: 7/9/2024  Visit 5/mn  DOI/DOS:  3/14/24  Medicare Cert Dates:  5/31/24 - 7/31/24  Referred by:   Corwin Lou  Time Calculation  Start Time: 1452  Stop Time: 1532  Time Calculation (min): 40 min     PT Therapeutic Procedures Time Entry  Manual Therapy Time Entry: 18  Therapeutic Exercise Time Entry: 22                 Diagnosis:   1. Pain, foot  Follow Up In Physical Therapy            PRECAUTIONS:  none    SUBJECTIVE:  She has pain rated 2/10 at the dorsum of the R foot.  It is described as tight.  She notes improved ROM and gait.  HEP compliance: yes    OBJECTIVE:  - R ankle AROM DF = 3, PF = 42 and INV/EVR = 15  - PROM DF = 6  - note continued edema in the R foot.  Worse at lateral malleoli  - amb with SPC in L hand.  While in clinic she amb without it.  Not improved gait with R LE getting into terminal stance    Treatment:  - continued with exercise for WB and ROM of the R ankle with emphasis on DF.  Add 1/2 roll balance.  Therapeutic Exercise  Therapeutic Exercise Activity 1: DF with GN TB 3x10  Therapeutic Exercise Activity 2: DF on step, 6 in, 2x10  Therapeutic Exercise Activity 3: SLS R 30 sec x2  Therapeutic Exercise Activity 5: airex step over with R foot on pad, 2x12  Therapeutic Exercise Activity 6: slant board stretch, 30 sec x 3  Therapeutic Exercise Activity 7: 1/2 roll balance, flat side down, 30 sec x2  Manual Therapy  Manual Therapy Activity 1: massage to reduce edema  Manual Therapy Activity 2: PROM into DF both supine and prone  Manual Therapy Activity 3: DF mobs             ASSESSMENT:  Good meliton of exercise.  She demonstrated improve A/PROM and gait.  Swelling is still present and restricts motion.  She will benefit from continued therapy to further improve strength, ROM and WB for return to normal gait with SPC.    PLAN:  Add calf raises

## 2024-07-19 ENCOUNTER — TREATMENT (OUTPATIENT)
Dept: PHYSICAL THERAPY | Facility: CLINIC | Age: 74
End: 2024-07-19
Payer: MEDICARE

## 2024-07-19 DIAGNOSIS — M79.673 PAIN, FOOT: ICD-10-CM

## 2024-07-19 DIAGNOSIS — M79.671 RIGHT FOOT PAIN: Primary | ICD-10-CM

## 2024-07-19 PROCEDURE — 97110 THERAPEUTIC EXERCISES: CPT | Mod: GP | Performed by: PHYSICAL THERAPIST

## 2024-07-19 PROCEDURE — 97140 MANUAL THERAPY 1/> REGIONS: CPT | Mod: GP | Performed by: PHYSICAL THERAPIST

## 2024-07-19 NOTE — PROGRESS NOTES
Physical Therapy Treatment    Patient Name: Zara Motta  MRN: [id number]  Today's Date: 7/19/2024  Visit 6/mn  DOI/DOS:  3/14/24  Medicare Cert Dates:  5/31/24 - 7/31/24  Referred by:   Corwin Lou  Time Calculation  Start Time: 1048  Stop Time: 1144  Time Calculation (min): 56 min     PT Therapeutic Procedures Time Entry  Manual Therapy Time Entry: 24  Therapeutic Exercise Time Entry: 22  PT Modalities Time Entry  Hot/Cold Pack Time Entry: 10              Diagnosis:   1. Right foot pain              PRECAUTIONS:  none    SUBJECTIVE:  He R foot is more swollen after doing more walking yesterday ( over an hour).   Her pain is rated 5/10 and is located at the entire foot.  It is described as an ache.  She felt achy for a few hours after her last session.  She saw her ortho MD on 7/10/24.  A x-ray was done which showed good healing (per patient).  He recommended she wear better shoes with good arch support.  Otherwise he was please with her progress (per patient).  HEP compliance:  yes    OBJECTIVE:  - AROM R ankle DF = 2 ( 5 on L), PF = 50, INV = 18, EVR = 12  - note increase in edema.  R ankle girth at malleoli = 29.5 cm    Treatment:  - focused on reducing swelling and pain.  Completed exercises in NWB positions as being on her feet increases pain.  - she was instructed to use her compression stocking, elevate her leg and use ice for 10-20 min when at home to reduce swelling.  Therapeutic Exercise  Therapeutic Exercise Activity 1: DF with GN TB 3x10  Therapeutic Exercise Activity 4: BAPS, L3, DF/PF, INV/EVR, cw/ccw x10  Therapeutic Exercise Activity 6: DF stretch with strap 30 sec x3  Manual Therapy  Manual Therapy Activity 1: massage to reduce edema  Manual Therapy Activity 2: PROM into DF both supine and prone        Modalities  Modality 1: Untimed Cold Pack (CP to R ankle for 10 min after exercise in supine with legs elevated.)    ASSESSMENT:  Good meliton of exercise.  Note decreased edema at dorsum of foot  and by lateral malleoli.  She will benefit from continued therapy to improve edema, ROM and WB for return to amb with  normal gait.    PLAN:  Resume WB exercise as meliton.

## 2024-07-22 ENCOUNTER — TREATMENT (OUTPATIENT)
Dept: PHYSICAL THERAPY | Facility: CLINIC | Age: 74
End: 2024-07-22
Payer: MEDICARE

## 2024-07-22 DIAGNOSIS — M79.673 PAIN, FOOT: ICD-10-CM

## 2024-07-22 PROCEDURE — 97110 THERAPEUTIC EXERCISES: CPT | Mod: GP | Performed by: PHYSICAL THERAPIST

## 2024-07-22 PROCEDURE — 97140 MANUAL THERAPY 1/> REGIONS: CPT | Mod: GP | Performed by: PHYSICAL THERAPIST

## 2024-07-22 NOTE — PROGRESS NOTES
Physical Therapy Treatment    Patient Name: Zara Motta  MRN: [id number]  Today's Date: 7/22/2024  Visit 7/mn  DOI/DOS:  3/14/24  Medicare Cert Dates:  5/31/24 - 7/31/24  Referred by:   Corwin Lou  Time Calculation  Start Time: 1052  Stop Time: 1133  Time Calculation (min): 41 min     PT Therapeutic Procedures Time Entry  Manual Therapy Time Entry: 18  Therapeutic Exercise Time Entry: 23                 Diagnosis:   1. Pain, foot  Follow Up In Physical Therapy            PRECAUTIONS:  none    SUBJECTIVE:  She reports 6/10 pain at the dorsal, plantar, and lateral aspect of the R foot.  The swelling comes and goes.  She wears a compression stocking 3-4 x a week when she is home, but doesn't wear it when outside her home.  She feels stiff today.  HEP compliance: yes    OBJECTIVE:  - R ankle AROM DF = 2, PF = 45, INV/EVR = 15  - note improved gait when amb without cane.  Speed increased, stride is longer and she is better able to achieve heel strike.  - continued to note mod edema throughout the R ankle though it is reduced as the dorsum of her foot.    Treatment:  - increased exercise and resumed WB activity.  Therapeutic Exercise  Therapeutic Exercise Activity 1: DF with GN TB 3x10  Therapeutic Exercise Activity 2: DF on step, 6 in, 2x10  Therapeutic Exercise Activity 3: amb without cane 10 ft x4  Therapeutic Exercise Activity 5: airex step over with R foot on pad, 2x12  Therapeutic Exercise Activity 6: DF stretch with strap 30 sec x3  Therapeutic Exercise Activity 7: 1/2 roll balance, flat side down, 30 sec x2  Manual Therapy  Manual Therapy Activity 1: massage to reduce edema  Manual Therapy Activity 2: PROM into DF both supine and prone             ASSESSMENT:  Isamar additional exercises well.  She demonstrates WFL AROM in all dir except DF.  However, the uninvolved ankle only has 5 degrees of DF.  Her gait is improved even when amb without a cane.  She will benefit from continued therapy to reduce swelling,  increase AROM into DF and improve gait for return to all ADLS.    PLAN:  Resume BAPS board and SLS

## 2024-08-05 ENCOUNTER — TREATMENT (OUTPATIENT)
Dept: PHYSICAL THERAPY | Facility: CLINIC | Age: 74
End: 2024-08-05
Payer: MEDICARE

## 2024-08-05 DIAGNOSIS — M79.673 PAIN, FOOT: Primary | ICD-10-CM

## 2024-08-05 PROCEDURE — 97110 THERAPEUTIC EXERCISES: CPT | Mod: GP | Performed by: PHYSICAL THERAPIST

## 2024-08-05 NOTE — LETTER
August 5, 2024    Corwin Lou MD  [address]  [facility] [address]    Patient: Zara Motta   YOB: 1950   Date of Visit: 8/5/2024       Dear Corwin Lou MD  [address]  [place] [address]    The attached plan of care is being sent to you because your patient’s medical reimbursement requires that you certify the plan of care. Your signature is required to allow uninterrupted insurance coverage.      You may indicate your approval by signing below and faxing this form back to us at Dept Fax: [phone].    Please call Dept: [phone] with any questions or concerns.    Thank you for this referral,        Dina Mishra, PT  [facility] [address] [facility]  [address] [facility] [address]    Payer: Payor: [employer] MEDICARE / Plan: [employer] MEDICARE / Product Type: *No Product type* /                                                                         Date:     Dear Dina Mishra, PT,     Re: Ms. Zara Motta, MRN:[id number]    I certify that I have reviewed the attached plan of care and it is medically necessary for Ms. Zara Motta (1950) who is under my care.          ______________________________________                    _________________  Provider name and credentials                                           Date and time                                                                                           Plan of Care 8/1/24   Effective from: 8/1/2024  Effective to: 10/1/2024    Plan ID: [id number]            Participants as of Finalize on 8/5/2024    Name Type Comments Contact Info    Corwin Lou MD Referring Provider  [phone]    Dina Mishra, PT Consulting Physician  [phone]       Last Plan Note     Author: Dina Mishra, PT Status: Sign when Signing Visit Last edited: 8/5/2024 12:30 PM         Physical Therapy Treatment    Patient Name: Zara Motta  MRN:  [id number]  Today's Date: 8/5/2024  Visit 8/mn  DOI/DOS:  3/14/24  Medicare Cert Dates:  8/1/24 - 10/1/24  Referred by:   Corwin Lou  Time Calculation  Start Time: 1235  Stop Time: 1319  Time Calculation (min): 44 min     PT Therapeutic Procedures Time Entry  Manual Therapy Time Entry: 5  Therapeutic Exercise Time Entry: 39                 Diagnosis:   1. Pain, foot  Follow Up In Physical Therapy            PRECAUTIONS:  none    SUBJECTIVE:  She doesn't think she is progressing.  She reports 5/10 pain and has difficulty walking still.  She is walking at home without a cane.   When outside her home she uses the cane.  The pain is located at the top and lateral R foot.  She has less swelling.  HEP compliance: yes    OBJECTIVE:  - R ankle AROM DF = 5, PF = 52, INV = 18, EVR = 20  - R ankle MMT = 5/5 all dir  - R malleoloar girth = 29.5 cm  - note improve gait, but she does still have a limp    Treatment:  - continued with exercise for WB, strength and proprioception.  Added Nustep and 4-way ankle.  Advanced 1/2 roll balance.  Therapeutic Exercise  Therapeutic Exercise Activity 1: 4-way ankle, GN TB 2x10  Therapeutic Exercise Activity 2: calf raise 2x10  Therapeutic Exercise Activity 5: airex step over with R foot on pad, 2x12  Therapeutic Exercise Activity 6: slantboard stretch 30 sec x3  Therapeutic Exercise Activity 7: 1/2 roll balance, flat side up, 1 min x2  Therapeutic Exercise Activity 8: NuStep L1, 5 min  Manual Therapy  Manual Therapy Activity 1: PROM all dir             ASSESSMENT:  Good meliton of today's rx.  She continues to have pain but does demonstrate improve AROM and gait.  Edema remains the same.  AROM is equal to the L ankle.  She will benefit from continued therapy to further improve strength, WB and proprioception for return to amb with a normal gait.    PLAN:  Add SLS           Current Participants as of 8/5/2024    Name Type Comments Contact Info    Corwin Lou MD Referring Provider   [phone]    Signature pending    Dina Mishra, PT Consulting Physician  [phone]

## 2024-08-05 NOTE — PROGRESS NOTES
Physical Therapy Treatment    Patient Name: Zara Motta  MRN: [id number]  Today's Date: 8/5/2024  Visit 8/mn  DOI/DOS:  3/14/24  Medicare Cert Dates:  8/1/24 - 10/1/24  Referred by:   Corwin Lou  Time Calculation  Start Time: 1235  Stop Time: 1319  Time Calculation (min): 44 min     PT Therapeutic Procedures Time Entry  Manual Therapy Time Entry: 5  Therapeutic Exercise Time Entry: 39                 Diagnosis:   1. Pain, foot  Follow Up In Physical Therapy            PRECAUTIONS:  none    SUBJECTIVE:  She doesn't think she is progressing.  She reports 5/10 pain and has difficulty walking still.  She is walking at home without a cane.   When outside her home she uses the cane.  The pain is located at the top and lateral R foot.  She has less swelling.  HEP compliance: yes    OBJECTIVE:  - R ankle AROM DF = 5, PF = 52, INV = 18, EVR = 20  - R ankle MMT = 5/5 all dir  - R malleoloar girth = 29.5 cm  - note improve gait, but she does still have a limp    Treatment:  - continued with exercise for WB, strength and proprioception.  Added Nustep and 4-way ankle.  Advanced 1/2 roll balance.  Therapeutic Exercise  Therapeutic Exercise Activity 1: 4-way ankle, GN TB 2x10  Therapeutic Exercise Activity 2: calf raise 2x10  Therapeutic Exercise Activity 5: airex step over with R foot on pad, 2x12  Therapeutic Exercise Activity 6: slantboard stretch 30 sec x3  Therapeutic Exercise Activity 7: 1/2 roll balance, flat side up, 1 min x2  Therapeutic Exercise Activity 8: NuStep L1, 5 min  Manual Therapy  Manual Therapy Activity 1: PROM all dir             ASSESSMENT:  Good meliton of today's rx.  She continues to have pain but does demonstrate improve AROM and gait.  Edema remains the same.  AROM is equal to the L ankle.  She will benefit from continued therapy to further improve strength, WB and proprioception for return to amb with a normal gait.    PLAN:  Add SLS

## 2024-08-05 NOTE — LETTER
August 5, 2024    Corwin Lou MD  [address]  [facility] [address]    Patient: Zara Motta   YOB: 1950   Date of Visit: 8/5/2024       Dear Corwin Lou MD  [address]  [place] [address]    The attached plan of care is being sent to you because your patient’s medical reimbursement requires that you certify the plan of care. Your signature is required to allow uninterrupted insurance coverage.      You may indicate your approval by signing below and faxing this form back to us at Dept Fax: [phone].    Please call Dept: [phone] with any questions or concerns.    Thank you for this referral,        Dina Mishra, PT  [facility] [address] [facility]  [address] [facility] [address]    Payer: Payor: [employer] MEDICARE / Plan: [employer] MEDICARE / Product Type: *No Product type* /                                                                         Date:     Dear Dina Mishra, PT,     Re: Ms. Zara Motta, MRN:[id number]    I certify that I have reviewed the attached plan of care and it is medically necessary for Ms. Zara Motta (1950) who is under my care.          ______________________________________                    _________________  Provider name and credentials                                           Date and time                                                                                      The following plan is in draft form.  Please refer to the current version for the most up-to-date information.                Plan of Care 8/1/24   Effective from: 8/1/2024  Effective to: 10/1/2024    Draft  Plan ID: [id number]               Participants as of 8/5/2024      Name Type Comments Contact Info    Corwin Lou MD Referring Provider  [phone]    Dina Mishra, PT Consulting Physician  [phone]          Last Plan Note       Author: Dina Mishra, PT Status: Incomplete  Last edited: 8/5/2024 12:30 PM           Physical Therapy Treatment    Patient Name: Zara Motta  MRN: [id number]  Today's Date: 8/5/2024  Visit 8/mn  DOI/DOS:  3/14/24  Medicare Cert Dates:  8/1/24 - 10/1/24  Referred by:   Corwin Lou  Time Calculation  Start Time: 1235  Stop Time: 1319  Time Calculation (min): 44 min     PT Therapeutic Procedures Time Entry  Manual Therapy Time Entry: 5  Therapeutic Exercise Time Entry: 39                 Diagnosis:   1. Pain, foot  Follow Up In Physical Therapy            PRECAUTIONS:  none    SUBJECTIVE:  She doesn't think she is progressing.  She reports 5/10 pain and has difficulty walking still.  She is walking at home without a cane.   When outside her home she uses the cane.  The pain is located at the top and lateral R foot.  She has less swelling.  HEP compliance: yes    OBJECTIVE:  - R ankle AROM DF = 5, PF = 52, INV = 18, EVR = 20  - R ankle MMT = 5/5 all dir  - R malleoloar girth = 29.5 cm  - note improve gait, but she does still have a limp    Treatment:  - continued with exercise for WB, strength and proprioception.  Added Nustep and 4-way ankle.  Advanced 1/2 roll balance.  Therapeutic Exercise  Therapeutic Exercise Activity 1: 4-way ankle, GN TB 2x10  Therapeutic Exercise Activity 2: calf raise 2x10  Therapeutic Exercise Activity 5: airex step over with R foot on pad, 2x12  Therapeutic Exercise Activity 6: slantboard stretch 30 sec x3  Therapeutic Exercise Activity 7: 1/2 roll balance, flat side up, 1 min x2  Therapeutic Exercise Activity 8: NuStep L1, 5 min  Manual Therapy  Manual Therapy Activity 1: PROM all dir             ASSESSMENT:  Good meliton of today's rx.  She continues to have pain but does demonstrate improve AROM and gait.  Edema remains the same.  AROM is equal to the L ankle.  She will benefit from continued therapy to further improve strength, WB and proprioception for return to amb with a normal gait.    PLAN:  Add SLS

## 2024-08-07 ENCOUNTER — APPOINTMENT (OUTPATIENT)
Dept: PHYSICAL THERAPY | Facility: CLINIC | Age: 74
End: 2024-08-07
Payer: MEDICARE

## 2024-08-12 ENCOUNTER — APPOINTMENT (OUTPATIENT)
Dept: PHYSICAL THERAPY | Facility: CLINIC | Age: 74
End: 2024-08-12
Payer: MEDICARE

## 2024-08-14 ENCOUNTER — APPOINTMENT (OUTPATIENT)
Dept: PHYSICAL THERAPY | Facility: CLINIC | Age: 74
End: 2024-08-14
Payer: MEDICARE

## 2024-08-20 PROBLEM — Z86.718 HISTORY OF DVT (DEEP VEIN THROMBOSIS): Status: ACTIVE | Noted: 2023-10-02

## 2024-08-20 PROBLEM — R55 SYNCOPE: Status: ACTIVE | Noted: 2024-03-14

## 2024-08-20 PROBLEM — R26.9 ABNORMAL GAIT: Status: ACTIVE | Noted: 2024-08-20

## 2024-08-20 PROBLEM — I73.9 INTERMITTENT CLAUDICATION (CMS-HCC): Status: ACTIVE | Noted: 2024-08-20

## 2024-08-20 PROBLEM — N32.89 BLADDER IRRITABILITY: Status: ACTIVE | Noted: 2024-08-20

## 2024-08-20 PROBLEM — S09.90XA HEAD INJURY, CLOSED, INITIAL ENCOUNTER: Status: ACTIVE | Noted: 2024-03-14

## 2024-08-21 ENCOUNTER — OFFICE VISIT (OUTPATIENT)
Dept: CARDIOLOGY | Facility: CLINIC | Age: 74
End: 2024-08-21
Payer: MEDICARE

## 2024-08-21 VITALS
BODY MASS INDEX: 32.78 KG/M2 | SYSTOLIC BLOOD PRESSURE: 145 MMHG | HEART RATE: 75 BPM | HEIGHT: 66 IN | RESPIRATION RATE: 18 BRPM | WEIGHT: 204 LBS | DIASTOLIC BLOOD PRESSURE: 90 MMHG

## 2024-08-21 DIAGNOSIS — I87.023 POSTTHROMBOTIC SYNDROME OF BOTH LOWER EXTREMITIES WITH INFLAMMATION: ICD-10-CM

## 2024-08-21 DIAGNOSIS — Z79.01 CHRONIC ANTICOAGULATION: ICD-10-CM

## 2024-08-21 DIAGNOSIS — I83.893 VARICOSE VEINS OF BILATERAL LOWER EXTREMITIES WITH OTHER COMPLICATIONS: ICD-10-CM

## 2024-08-21 DIAGNOSIS — I82.403 RECURRENT DEEP VEIN THROMBOSIS (DVT) OF BOTH LOWER EXTREMITIES (MULTI): Primary | ICD-10-CM

## 2024-08-21 DIAGNOSIS — I87.023 POSTTHROMBOTIC SYNDROME OF BOTH LOWER EXTREMITIES WITH INFLAMMATION: Primary | ICD-10-CM

## 2024-08-21 PROCEDURE — 1125F AMNT PAIN NOTED PAIN PRSNT: CPT | Performed by: INTERNAL MEDICINE

## 2024-08-21 PROCEDURE — 1123F ACP DISCUSS/DSCN MKR DOCD: CPT | Performed by: INTERNAL MEDICINE

## 2024-08-21 PROCEDURE — 1036F TOBACCO NON-USER: CPT | Performed by: INTERNAL MEDICINE

## 2024-08-21 PROCEDURE — 1160F RVW MEDS BY RX/DR IN RCRD: CPT | Performed by: INTERNAL MEDICINE

## 2024-08-21 PROCEDURE — 1159F MED LIST DOCD IN RCRD: CPT | Performed by: INTERNAL MEDICINE

## 2024-08-21 PROCEDURE — 99214 OFFICE O/P EST MOD 30 MIN: CPT | Performed by: INTERNAL MEDICINE

## 2024-08-21 ASSESSMENT — PAIN SCALES - GENERAL: PAINLEVEL: 4

## 2024-08-21 ASSESSMENT — PATIENT HEALTH QUESTIONNAIRE - PHQ9
SUM OF ALL RESPONSES TO PHQ9 QUESTIONS 1 AND 2: 0
2. FEELING DOWN, DEPRESSED OR HOPELESS: NOT AT ALL
1. LITTLE INTEREST OR PLEASURE IN DOING THINGS: NOT AT ALL

## 2024-08-21 ASSESSMENT — COLUMBIA-SUICIDE SEVERITY RATING SCALE - C-SSRS
6. HAVE YOU EVER DONE ANYTHING, STARTED TO DO ANYTHING, OR PREPARED TO DO ANYTHING TO END YOUR LIFE?: NO
2. HAVE YOU ACTUALLY HAD ANY THOUGHTS OF KILLING YOURSELF?: NO
1. IN THE PAST MONTH, HAVE YOU WISHED YOU WERE DEAD OR WISHED YOU COULD GO TO SLEEP AND NOT WAKE UP?: NO

## 2024-08-21 NOTE — PATIENT INSTRUCTIONS
ASSESSMENT/PLAN:    here for follow up recurrent VTE on long term AC with eliquis - Continue the eliquis now. Will plan reduced dosing secondary prophylaxis at next visit. Discussed getting measured and fitted for compression., Rx 20-30 mmHg stocking.     +VI scan as noted - reports prev vein procedure RLE with Dr. Davidson (20 years ago). ?recanalization. Referred to Dr. Wise for the +VI and ongoing swelling.     Follow up 6 months.

## 2024-08-21 NOTE — PROGRESS NOTES
"OUTPATIENT FOLLOW-UP -  VASCULAR MEDICINE    DOS: 8/21/2024  Last seen:    5/29/2024    REQUESTING PHYSICIAN:  Dr. Ha Damian PCP, self-referred    REASON FOR FOLLOW-UP:  here for follow up recurrent VTE on long term AC with eliquis    HISTORY OF PRESENT ILLNESS:     75 yo lady here for follow up recurrent VTE on long term AC with eliquis.  Does not have a med-alert bracelet. Denies bleeding on the AC. Reports leg swelling - used the compression. Reports still has foot swelling. Today using OTC compression. Reports these are ?XL.     From prev notes:  Has h/o recurrent VTE including PE 2000 - this was idiopathic (?OCPs at that time). Rx warfarin x 6 months. Acute DVT RLE 2002 - also idiopathic - was on warfarin and planned for indefinite therapy. Did well on warfarin without bleeding. Was doing home POCT. Had bunny TKA in 2012 and this was complicated by LLE DVT. Warfarin was maintained. Has had bunny MINERVA - left approx 10 years ago. Right MINERVA - 2022 - no VTE but AC was changed at that time to eliquis as noted. Foot and ankle fx 3/2024. Underwent ORIF - AC was not resumed post-op -  did not have SCDs, no lovenox or UFH - reports AC held for approx 6 days. Dx with RLE calf vein DVT approx 2 weeks after surgery - was back on AC at that time. By prev reports - has had thrombophilia testing and this was normal - no records available.     Left foot started swelling 1 year +/-. Since starting - this is worse - relates this to not walking and being NWB right. Reports her \"regular compression will not fit on her feet bc of swelling. Reports uses compression she purchases on [employer] but does not know the grade. These were last used about a month ago before she broke her foot. PT provided light compression from a role - this is helping \"a little bit\" using a single layer of compression. Sleeps in bed - foot of the bed is not elevated. Did sleep in a chair for a month - could not get up stairs. No sores or ulcer related to " "the swelling.       REVIEW OF SYSTEMS:     weight is stable  No sores, ulcers, rashes, +skin lesions  No CP, chest pressure  No cough, SOB  No BRBPR, melena, hematuria  No bleeding  + edema, no calf pain    PHYSICAL EXAMINATION:  /90 (BP Location: Left arm, Patient Position: Sitting)   Pulse 75   Resp 18   Ht 1.676 m (5' 6\")   Wt 92.5 kg (204 lb)   LMP  (LMP Unknown)   BMI 32.93 kg/m²      Gen: Appears well, NAD  Chest: CTA  CVS: regular without murmur or gallop  Ext: no edema, nontender  Skin: good condition without wounds or lesions  Pulses: DP 2+; PT 2+  Mood and affect appropriate    ADDITIONAL DATA:   Wearing compression, unsure of strength. Righr calf: 42.0cm  Left calf: 43.0cm    CONCLUSIONS:  Right Lower Venous Insufficiency: Reflux is noted in the saphenofemoral junction, proximal thigh great saphenous, mid thigh great saphenous, knee level of great saphenous, proximal calf great saphenous and mid calf great saphenous veins. Nultiple varicosities are seen in the calf.  Left Lower Venous Insufficiency: Reflux is noted in the knee level of great saphenous vein. The GSV in the calf exits the fascia prox calf and is not visualized at the mid to distal level.  Right Lower Venous: There is age indeterminate deep vein thrombosis visualized in the peroneal vein. The remainder of the right leg is negative for deep vein thrombosis. Additional Findings; Lymph nodes  Left Lower Venous: No evidence of acute deep vein thrombus visualized in the left lower extremity.       ASSESSMENT/PLAN:    here for follow up recurrent VTE on long term AC with eliquis - Continue the eliquis now. Will plan reduced dosing secondary prophylaxis at next visit. Discussed getting measured and fitted for compression., Rx 20-30 mmHg stocking.     +VI scan as noted - reports prev vein procedure RLE with Dr. Davidson (20 years ago). ?recanalization. Referred to Dr. Wise for the +VI and ongoing swelling.     Follow up 6 months.      "

## 2024-08-22 ENCOUNTER — TREATMENT (OUTPATIENT)
Dept: PHYSICAL THERAPY | Facility: CLINIC | Age: 74
End: 2024-08-22
Payer: MEDICARE

## 2024-08-22 DIAGNOSIS — M79.673 PAIN, FOOT: ICD-10-CM

## 2024-08-22 PROCEDURE — 97140 MANUAL THERAPY 1/> REGIONS: CPT | Mod: GP | Performed by: PHYSICAL THERAPIST

## 2024-08-22 PROCEDURE — 97110 THERAPEUTIC EXERCISES: CPT | Mod: GP | Performed by: PHYSICAL THERAPIST

## 2024-09-11 ENCOUNTER — APPOINTMENT (OUTPATIENT)
Dept: PHYSICAL THERAPY | Facility: CLINIC | Age: 74
End: 2024-09-11
Payer: MEDICARE

## 2024-09-11 ASSESSMENT — ENCOUNTER SYMPTOMS
FREQUENCY: 1
CONSTIPATION: 1

## 2024-09-12 ENCOUNTER — TELEPHONE (OUTPATIENT)
Dept: PRIMARY CARE | Facility: CLINIC | Age: 74
End: 2024-09-12
Payer: MEDICARE

## 2024-09-12 DIAGNOSIS — U07.1 COVID-19: Primary | ICD-10-CM

## 2024-09-12 NOTE — TELEPHONE ENCOUNTER
Pt tested covid positive today. Fever and body aches started yesterday. Covid and flu vaccine given Tuesday. Pt wants to know if she should get paxlovid.

## 2024-09-16 ENCOUNTER — TREATMENT (OUTPATIENT)
Dept: PHYSICAL THERAPY | Facility: CLINIC | Age: 74
End: 2024-09-16
Payer: MEDICARE

## 2024-09-16 ENCOUNTER — APPOINTMENT (OUTPATIENT)
Dept: OBSTETRICS AND GYNECOLOGY | Facility: CLINIC | Age: 74
End: 2024-09-16
Payer: MEDICARE

## 2024-09-16 VITALS
WEIGHT: 200 LBS | BODY MASS INDEX: 32.14 KG/M2 | SYSTOLIC BLOOD PRESSURE: 138 MMHG | HEIGHT: 66 IN | DIASTOLIC BLOOD PRESSURE: 84 MMHG

## 2024-09-16 DIAGNOSIS — Z12.31 ENCOUNTER FOR SCREENING MAMMOGRAM FOR BREAST CANCER: ICD-10-CM

## 2024-09-16 DIAGNOSIS — Z01.419 WELL WOMAN EXAM: ICD-10-CM

## 2024-09-16 DIAGNOSIS — R10.2 PELVIC PAIN: Primary | ICD-10-CM

## 2024-09-16 DIAGNOSIS — N95.0 PMB (POSTMENOPAUSAL BLEEDING): ICD-10-CM

## 2024-09-16 DIAGNOSIS — M79.673 PAIN, FOOT: Primary | ICD-10-CM

## 2024-09-16 PROCEDURE — 1159F MED LIST DOCD IN RCRD: CPT | Performed by: OBSTETRICS & GYNECOLOGY

## 2024-09-16 PROCEDURE — 97140 MANUAL THERAPY 1/> REGIONS: CPT | Mod: GP | Performed by: PHYSICAL THERAPIST

## 2024-09-16 PROCEDURE — 1123F ACP DISCUSS/DSCN MKR DOCD: CPT | Performed by: OBSTETRICS & GYNECOLOGY

## 2024-09-16 PROCEDURE — 1036F TOBACCO NON-USER: CPT | Performed by: OBSTETRICS & GYNECOLOGY

## 2024-09-16 PROCEDURE — 97110 THERAPEUTIC EXERCISES: CPT | Mod: GP | Performed by: PHYSICAL THERAPIST

## 2024-09-16 PROCEDURE — 99397 PER PM REEVAL EST PAT 65+ YR: CPT | Performed by: OBSTETRICS & GYNECOLOGY

## 2024-09-16 NOTE — PROGRESS NOTES
"Zara Motta is a 74 y.o. female who is here for a routine exam. PCP = Ha Damian MD  Still having spotting, not sexually active  Left pelvic pain intermittent  History of constipation  Colonoscopy 1 to 2 years ago, polyps    Menses :   Contraception : other  HPV vaccine : No  Last pap : 2016 normal  Last HPV : 2016 negative  History of abnormal pap : No  Last mammogram : 2023 normal  History of abnormal mammogram : No  Colon cancer screen : 1 to 2 years ago, polyps    ROS  systems reviewed, anything negative noted in HPI    bladder: no dysuria, gross hematuria, urinary frequency, urgency or incontinence  breast: no lumps, nipple d/c, overlying skin changes, redness, or skin retraction    [unfilled]    Past med hx and past surg hx reviewed and notable for: Colon polyps    Objective   /84   Ht 1.676 m (5' 6\")   Wt 90.7 kg (200 lb)   LMP  (LMP Unknown)   BMI 32.28 kg/m²      General:   Alert and oriented, in no acute distress   Neck: Supple. No visible thyromegaly.    Breast/Axilla: Normal to palpation bilaterally without masses, skin changes, lymphadenopathy, or nipple discharge.    Abdomen: Soft, non-tender, without masses or organomegaly   Vulva: Normal architecture without erythema, masses, or lesions.    Vagina: Normal mucosa without lesions, masses, or atrophy. No abnormal vaginal discharge.    Cervix: Normal without masses, lesions, or signs of cervicitis.    Uterus: Normal mobile, non-enlarged uterus    Adnexa: Normal without masses or lesions   Pelvic Floor No POP noted.    Psych Normal affect. Normal mood.      Thank you for coming to your annual exam. Your findings during the exam were normal. Specific topics addressed during this exam included: healthy lifestyle, well woman screening guidelines,     Actions performed during this visit include:  - Clinical breast exam  - Clinical pelvic exam  - Pap no longer indicated  - Colon cancer screen up to date    Orders Placed This Encounter "   Procedures    US pelvis    BI mammo bilateral screening tomosynthesis           Please return for your next visit in 1 year.  Answers submitted by the patient for this visit:  Female Genital Questionnaire (Submitted on 9/11/2024)  Chief Complaint: Female genitourinary complaint  vaginal discharge: Yes  Chronicity: recurrent  Onset: more than 1 year ago  Frequency: intermittently  Progression since onset: unchanged  Pregnant now?: No  constipation: Yes  frequency: Yes  joint pain: Yes  joint swelling: Yes  urgency: Yes  Aggravated by: nothing  Sexual activity: not sexually active  Partner with STD symptoms: no  Birth control: nothing  Menstrual history: postmenopausal  Discharge characteristics: brown, scant  Passing clots?: No  Passing tissue?: No

## 2024-09-16 NOTE — PROGRESS NOTES
Physical Therapy Treatment    Patient Name: Zara Motta  MRN: [id number]  Today's Date: 9/16/2024  Visit 10/mn  DOI/DOS:  3/14/24  Medicare Cert Dates:  8/1/24 - 10/1/24  Referred by:   Corwin Lou  Time Calculation  Start Time: 1321  Stop Time: 1406  Time Calculation (min): 45 min     PT Therapeutic Procedures Time Entry  Manual Therapy Time Entry: 10  Therapeutic Exercise Time Entry: 35                 Diagnosis:   1. Pain, foot  Follow Up In Physical Therapy            PRECAUTIONS:  none    SUBJECTIVE:  Decreased swelling though it is still present.  She feels her ROM is good, but the ankle is still weak.  She has 5/10 pain at the lateral and anterior R ankle.  She has started using the cane outside her home again because she feels like she needs it.  HEP compliance: yes    OBJECTIVE:  - R ankle AROM DF = 5, PF = 50, INV = 15, EVR = 20  - R ankle MMT DF/INV/EVR = 5/5, PF = 4/5  - presents with SPC in L UE.  However, with cueing she can walk well without assistive device achieving heel strike and good stride.    Treatment:  - continued with exercise for ankle ROM, strength and proprioception  Therapeutic Exercise  Therapeutic Exercise Activity 1: 4-way ankle, GN TB, 2x10  Therapeutic Exercise Activity 2: calf raises 3x10  Therapeutic Exercise Activity 3: R SLS 30 sec x2  Therapeutic Exercise Activity 4: standing toe lift, 3x12  Therapeutic Exercise Activity 6: slantboard stretch 30 sec x3  Therapeutic Exercise Activity 7: 1/2 roll balance, flat side up, 1 min  Therapeutic Exercise Activity 8: NuStep L1, 5 min  Manual Therapy  Manual Therapy Activity 1: PROM into DF, R  Manual Therapy Activity 2: DF mobs R             ASSESSMENT:  Isamar rx well.  AROM into DF is the main limitation.  However, she is able to walk well when focusing on the proper motion.  She will benefit from continued therapy to improve gait, ROM and WB.      PLAN:  She will be OOT for 2 weeks starting 9/20/24.  We will try to get another  session in before she leaves.

## 2024-09-19 ENCOUNTER — HOSPITAL ENCOUNTER (OUTPATIENT)
Dept: RADIOLOGY | Facility: CLINIC | Age: 74
Discharge: HOME | End: 2024-09-19
Payer: MEDICARE

## 2024-09-19 ENCOUNTER — APPOINTMENT (OUTPATIENT)
Dept: PHYSICAL THERAPY | Facility: CLINIC | Age: 74
End: 2024-09-19
Payer: MEDICARE

## 2024-09-19 DIAGNOSIS — R10.2 PELVIC PAIN: ICD-10-CM

## 2024-10-02 ENCOUNTER — APPOINTMENT (OUTPATIENT)
Dept: OPHTHALMOLOGY | Facility: CLINIC | Age: 74
End: 2024-10-02
Payer: MEDICARE

## 2024-11-03 DIAGNOSIS — K21.9 GASTRO-ESOPHAGEAL REFLUX DISEASE WITHOUT ESOPHAGITIS: ICD-10-CM

## 2024-11-03 DIAGNOSIS — F33.41 RECURRENT MAJOR DEPRESSIVE DISORDER, IN PARTIAL REMISSION (CMS-HCC): ICD-10-CM

## 2024-11-03 DIAGNOSIS — E78.5 HYPERLIPIDEMIA, UNSPECIFIED: ICD-10-CM

## 2024-11-05 DIAGNOSIS — E78.5 HYPERLIPIDEMIA, UNSPECIFIED HYPERLIPIDEMIA TYPE: Primary | ICD-10-CM

## 2024-11-05 RX ORDER — EZETIMIBE 10 MG/1
10 TABLET ORAL DAILY
Qty: 90 TABLET | Refills: 1 | OUTPATIENT
Start: 2024-11-05

## 2024-11-05 RX ORDER — PANTOPRAZOLE SODIUM 40 MG/1
40 TABLET, DELAYED RELEASE ORAL DAILY PRN
Qty: 90 TABLET | Refills: 1 | OUTPATIENT
Start: 2024-11-05

## 2024-11-05 RX ORDER — SIMVASTATIN 40 MG/1
40 TABLET, FILM COATED ORAL NIGHTLY
Qty: 90 TABLET | Refills: 1 | OUTPATIENT
Start: 2024-11-05

## 2024-11-05 RX ORDER — TRAZODONE HYDROCHLORIDE 100 MG/1
100 TABLET ORAL NIGHTLY PRN
Qty: 90 TABLET | Refills: 1 | OUTPATIENT
Start: 2024-11-05

## 2024-11-12 ENCOUNTER — LAB (OUTPATIENT)
Dept: LAB | Facility: LAB | Age: 74
End: 2024-11-12
Payer: MEDICARE

## 2024-11-12 DIAGNOSIS — E78.5 HYPERLIPIDEMIA, UNSPECIFIED HYPERLIPIDEMIA TYPE: ICD-10-CM

## 2024-11-12 DIAGNOSIS — E78.5 HYPERLIPIDEMIA, UNSPECIFIED: ICD-10-CM

## 2024-11-12 LAB
ALBUMIN SERPL BCP-MCNC: 4.1 G/DL (ref 3.4–5)
ALP SERPL-CCNC: 73 U/L (ref 33–136)
ALT SERPL W P-5'-P-CCNC: 14 U/L (ref 7–45)
ANION GAP SERPL CALC-SCNC: 11 MMOL/L (ref 10–20)
AST SERPL W P-5'-P-CCNC: 13 U/L (ref 9–39)
BILIRUB SERPL-MCNC: 1.3 MG/DL (ref 0–1.2)
BUN SERPL-MCNC: 15 MG/DL (ref 6–23)
CALCIUM SERPL-MCNC: 9.6 MG/DL (ref 8.6–10.6)
CHLORIDE SERPL-SCNC: 106 MMOL/L (ref 98–107)
CHOLEST SERPL-MCNC: 181 MG/DL (ref 0–199)
CHOLESTEROL/HDL RATIO: 2.4
CO2 SERPL-SCNC: 30 MMOL/L (ref 21–32)
CREAT SERPL-MCNC: 0.89 MG/DL (ref 0.5–1.05)
EGFRCR SERPLBLD CKD-EPI 2021: 68 ML/MIN/1.73M*2
GLUCOSE SERPL-MCNC: 88 MG/DL (ref 74–99)
HDLC SERPL-MCNC: 75.4 MG/DL
LDLC SERPL CALC-MCNC: 86 MG/DL
NON HDL CHOLESTEROL: 106 MG/DL (ref 0–149)
POTASSIUM SERPL-SCNC: 4 MMOL/L (ref 3.5–5.3)
PROT SERPL-MCNC: 6.5 G/DL (ref 6.4–8.2)
SODIUM SERPL-SCNC: 143 MMOL/L (ref 136–145)
TRIGL SERPL-MCNC: 99 MG/DL (ref 0–149)
VLDL: 20 MG/DL (ref 0–40)

## 2024-11-12 PROCEDURE — 36415 COLL VENOUS BLD VENIPUNCTURE: CPT

## 2024-11-12 PROCEDURE — 80053 COMPREHEN METABOLIC PANEL: CPT

## 2024-11-12 PROCEDURE — 80061 LIPID PANEL: CPT

## 2024-11-12 PROCEDURE — 82172 ASSAY OF APOLIPOPROTEIN: CPT

## 2024-11-15 ENCOUNTER — APPOINTMENT (OUTPATIENT)
Dept: PRIMARY CARE | Facility: CLINIC | Age: 74
End: 2024-11-15
Payer: MEDICARE

## 2024-11-15 ENCOUNTER — LAB (OUTPATIENT)
Dept: LAB | Facility: LAB | Age: 74
End: 2024-11-15
Payer: MEDICARE

## 2024-11-15 VITALS
HEART RATE: 66 BPM | BODY MASS INDEX: 32.47 KG/M2 | DIASTOLIC BLOOD PRESSURE: 84 MMHG | WEIGHT: 202 LBS | SYSTOLIC BLOOD PRESSURE: 132 MMHG | RESPIRATION RATE: 12 BRPM | OXYGEN SATURATION: 99 % | HEIGHT: 66 IN

## 2024-11-15 DIAGNOSIS — I73.9 INTERMITTENT CLAUDICATION (CMS-HCC): ICD-10-CM

## 2024-11-15 DIAGNOSIS — M54.41 CHRONIC BILATERAL LOW BACK PAIN WITH RIGHT-SIDED SCIATICA: ICD-10-CM

## 2024-11-15 DIAGNOSIS — I83.10 VARICOSE VEINS WITH INFLAMMATION: ICD-10-CM

## 2024-11-15 DIAGNOSIS — F51.01 PRIMARY INSOMNIA: ICD-10-CM

## 2024-11-15 DIAGNOSIS — R26.9 ABNORMAL GAIT: ICD-10-CM

## 2024-11-15 DIAGNOSIS — D64.9 NORMOCYTIC ANEMIA: ICD-10-CM

## 2024-11-15 DIAGNOSIS — E78.5 HYPERLIPIDEMIA, UNSPECIFIED: ICD-10-CM

## 2024-11-15 DIAGNOSIS — G89.29 CHRONIC BILATERAL LOW BACK PAIN WITH RIGHT-SIDED SCIATICA: ICD-10-CM

## 2024-11-15 DIAGNOSIS — K21.9 GASTRO-ESOPHAGEAL REFLUX DISEASE WITHOUT ESOPHAGITIS: ICD-10-CM

## 2024-11-15 DIAGNOSIS — M15.9 GENERALIZED OA: ICD-10-CM

## 2024-11-15 DIAGNOSIS — Z86.718 HX OF DEEP VENOUS THROMBOSIS: ICD-10-CM

## 2024-11-15 DIAGNOSIS — I51.9 DIASTOLIC DYSFUNCTION, LEFT VENTRICLE: Primary | ICD-10-CM

## 2024-11-15 DIAGNOSIS — N32.81 OAB (OVERACTIVE BLADDER): ICD-10-CM

## 2024-11-15 DIAGNOSIS — F33.41 RECURRENT MAJOR DEPRESSIVE DISORDER, IN PARTIAL REMISSION (CMS-HCC): ICD-10-CM

## 2024-11-15 LAB
BASOPHILS # BLD AUTO: 0.03 X10*3/UL (ref 0–0.1)
BASOPHILS NFR BLD AUTO: 0.6 %
EOSINOPHIL # BLD AUTO: 0.09 X10*3/UL (ref 0–0.4)
EOSINOPHIL NFR BLD AUTO: 1.8 %
ERYTHROCYTE [DISTWIDTH] IN BLOOD BY AUTOMATED COUNT: 13.7 % (ref 11.5–14.5)
HCT VFR BLD AUTO: 39 % (ref 36–46)
HGB BLD-MCNC: 12.1 G/DL (ref 12–16)
IMM GRANULOCYTES # BLD AUTO: 0.02 X10*3/UL (ref 0–0.5)
IMM GRANULOCYTES NFR BLD AUTO: 0.4 % (ref 0–0.9)
LPA SERPL-MCNC: 8 MG/DL
LYMPHOCYTES # BLD AUTO: 1.18 X10*3/UL (ref 0.8–3)
LYMPHOCYTES NFR BLD AUTO: 23.4 %
MCH RBC QN AUTO: 27.7 PG (ref 26–34)
MCHC RBC AUTO-ENTMCNC: 31 G/DL (ref 32–36)
MCV RBC AUTO: 89 FL (ref 80–100)
MONOCYTES # BLD AUTO: 0.43 X10*3/UL (ref 0.05–0.8)
MONOCYTES NFR BLD AUTO: 8.5 %
NEUTROPHILS # BLD AUTO: 3.29 X10*3/UL (ref 1.6–5.5)
NEUTROPHILS NFR BLD AUTO: 65.3 %
NRBC BLD-RTO: 0 /100 WBCS (ref 0–0)
PLATELET # BLD AUTO: 230 X10*3/UL (ref 150–450)
RBC # BLD AUTO: 4.37 X10*6/UL (ref 4–5.2)
WBC # BLD AUTO: 5 X10*3/UL (ref 4.4–11.3)

## 2024-11-15 PROCEDURE — 1159F MED LIST DOCD IN RCRD: CPT | Performed by: FAMILY MEDICINE

## 2024-11-15 PROCEDURE — 1036F TOBACCO NON-USER: CPT | Performed by: FAMILY MEDICINE

## 2024-11-15 PROCEDURE — 1123F ACP DISCUSS/DSCN MKR DOCD: CPT | Performed by: FAMILY MEDICINE

## 2024-11-15 PROCEDURE — 36415 COLL VENOUS BLD VENIPUNCTURE: CPT

## 2024-11-15 PROCEDURE — 99214 OFFICE O/P EST MOD 30 MIN: CPT | Performed by: FAMILY MEDICINE

## 2024-11-15 PROCEDURE — 85025 COMPLETE CBC W/AUTO DIFF WBC: CPT

## 2024-11-15 RX ORDER — EZETIMIBE 10 MG/1
10 TABLET ORAL DAILY
Qty: 100 TABLET | Refills: 0 | Status: SHIPPED | OUTPATIENT
Start: 2024-11-15

## 2024-11-15 RX ORDER — PANTOPRAZOLE SODIUM 40 MG/1
40 TABLET, DELAYED RELEASE ORAL DAILY PRN
Qty: 100 TABLET | Refills: 0 | Status: SHIPPED | OUTPATIENT
Start: 2024-11-15

## 2024-11-15 RX ORDER — TRAZODONE HYDROCHLORIDE 100 MG/1
100 TABLET ORAL NIGHTLY PRN
Qty: 100 TABLET | Refills: 0 | Status: SHIPPED | OUTPATIENT
Start: 2024-11-15

## 2024-11-15 RX ORDER — SIMVASTATIN 40 MG/1
40 TABLET, FILM COATED ORAL NIGHTLY
Qty: 100 TABLET | Refills: 0 | Status: SHIPPED | OUTPATIENT
Start: 2024-11-15

## 2024-11-15 RX ORDER — BUPROPION HYDROCHLORIDE 150 MG/1
150 TABLET ORAL DAILY
Qty: 100 TABLET | Refills: 0 | Status: SHIPPED | OUTPATIENT
Start: 2024-11-15

## 2024-11-15 RX ORDER — OXYBUTYNIN CHLORIDE 10 MG/1
10 TABLET, EXTENDED RELEASE ORAL DAILY
Qty: 100 TABLET | Refills: 0 | Status: SHIPPED | OUTPATIENT
Start: 2024-11-15

## 2024-11-15 ASSESSMENT — ENCOUNTER SYMPTOMS
NERVOUS/ANXIOUS: 1
DIARRHEA: 0
HEADACHES: 0
SLEEP DISTURBANCE: 1
DIFFICULTY URINATING: 0
ADENOPATHY: 0
BRUISES/BLEEDS EASILY: 0
CHILLS: 0
APPETITE CHANGE: 0
BACK PAIN: 1
CHEST TIGHTNESS: 0
DYSURIA: 0
LIGHT-HEADEDNESS: 0
MYALGIAS: 0
SINUS PRESSURE: 0
FATIGUE: 0
FEVER: 0
SHORTNESS OF BREATH: 0
NAUSEA: 0
DYSPHORIC MOOD: 1
ABDOMINAL PAIN: 0
ARTHRALGIAS: 1
ABDOMINAL DISTENTION: 0
WHEEZING: 0

## 2024-11-15 NOTE — PROGRESS NOTES
"Subjective   Patient ID: Zara Motta is a 74 y.o. female who presents for Follow-up.    HPI     Review of Systems    Objective   /84   Pulse 66   Resp 12   Ht 1.676 m (5' 6\")   Wt 91.6 kg (202 lb)   LMP  (LMP Unknown)   SpO2 99%   BMI 32.60 kg/m²     Physical Exam    Assessment/Plan          "

## 2024-11-15 NOTE — PROGRESS NOTES
Subjective   Patient ID: Zara Motta is a 74 y.o. female who presents for Follow-up.  Pt has Dyslipidemia.   Lipid panel showed LDL 86.  Currently taking Simvastatin and is tolerating well without muscle pains or weakness.     For OAB  taking Oxybutynin . Doing well and tolerating medication well. Frequency is not bothersome.     GERD is well controlled on Protonix . Pt is taking medication daily. Denies epigastric pain, nausea, heartburn or water brash.     For VV/venous reflux she is using compression hose. Seeing vacular.    For abnormal gait she is seeing neurology. Denies tremor.    For hip and back pain she is taking Meloxicam and see pain mngt. She is planning to DO for osteopathic manipulation. Also to see spine doctor for leg length discrepancy.    For insomnia pt is taking Trazodone.  Tolerating well. Getting 5-6 hours of sleep. Not having trouble getting to sleep or staying asleep. Daytime energy is low.    Pt has chronic and stable depression / anxiety.  Pt  has supportive family/friends.   Pt not seeing a counselor.   Taking Bupropion and it is helping.   Mood, energy, motivation, interests, sleep and appetite are adequate. Anxiety is stable.  Pt denies suicidal ideations.     Labs back in June showed normocytic anemia with normal iron and vitamin levels.         Review of Systems   Constitutional:  Negative for appetite change, chills, fatigue and fever.   HENT:  Negative for congestion, ear pain and sinus pressure.    Eyes:  Negative for visual disturbance.   Respiratory:  Negative for chest tightness, shortness of breath and wheezing.    Cardiovascular:  Negative for chest pain.   Gastrointestinal:  Negative for abdominal distention, abdominal pain, diarrhea and nausea.   Genitourinary:  Negative for difficulty urinating, dysuria and pelvic pain.   Musculoskeletal:  Positive for arthralgias and back pain. Negative for myalgias.   Skin:  Negative for rash.   Allergic/Immunologic: Negative for  "immunocompromised state.   Neurological:  Negative for light-headedness and headaches.   Hematological:  Negative for adenopathy. Does not bruise/bleed easily.   Psychiatric/Behavioral:  Positive for dysphoric mood and sleep disturbance. The patient is nervous/anxious.        Objective   /84   Pulse 66   Resp 12   Ht 1.676 m (5' 6\")   Wt 91.6 kg (202 lb)   LMP  (LMP Unknown)   SpO2 99%   BMI 32.60 kg/m²    Physical Exam  Constitutional:       General: She is not in acute distress.     Appearance: Normal appearance.   Cardiovascular:      Rate and Rhythm: Normal rate and regular rhythm.      Heart sounds: Normal heart sounds. No murmur heard.  Pulmonary:      Effort: Pulmonary effort is normal.      Breath sounds: Normal breath sounds.   Abdominal:      Palpations: Abdomen is soft.      Tenderness: There is no abdominal tenderness.   Neurological:      Mental Status: She is alert.   Psychiatric:         Mood and Affect: Mood normal.         Judgment: Judgment normal.           Assessment/Plan   Diagnoses and all orders for this visit:  Diastolic dysfunction, left ventricle, Hyperlipidemia - stable, conitnue statin and monitor  Normocytic anemia - recheck CBC today, consider Hematology consult  Gastro-esophageal reflux disease  - stable on Protonix, continue  Recurrent major depressive disorder - stable on Bupropion  Primary insomnia - stable, continue Trazodone  Hx of deep venous thrombosis/ PE - doing well on Eliquis  OAB (overactive bladder) - controlled with Oxybutynin  Varicose veins with inflammation/Intermittent claudication - better with compression hose, monitoring with vascular  Chronic bilateral low back pain with right-sided sciatica/Generalized OA - still not well controlled, seeing pain mngt.  Abnormal gait - to see neurology  Weight - recommend low carb diet, increasing water intake to at least 64oz/day, healthy snacking between meals, and regular cardiovascular exercise as able. Goal for " weight loss is 1-2# per week.    Follow up in 6 months, 30mins. Preventative due in  February

## 2024-11-24 DIAGNOSIS — E78.5 HYPERLIPIDEMIA, UNSPECIFIED: ICD-10-CM

## 2024-11-24 DIAGNOSIS — F33.41 RECURRENT MAJOR DEPRESSIVE DISORDER, IN PARTIAL REMISSION (CMS-HCC): ICD-10-CM

## 2024-11-24 DIAGNOSIS — K21.9 GASTRO-ESOPHAGEAL REFLUX DISEASE WITHOUT ESOPHAGITIS: ICD-10-CM

## 2024-11-25 RX ORDER — PANTOPRAZOLE SODIUM 40 MG/1
40 TABLET, DELAYED RELEASE ORAL DAILY PRN
Qty: 90 TABLET | Refills: 1 | OUTPATIENT
Start: 2024-11-25

## 2024-11-25 RX ORDER — BUPROPION HYDROCHLORIDE 150 MG/1
150 TABLET ORAL DAILY
Qty: 90 TABLET | Refills: 1 | OUTPATIENT
Start: 2024-11-25

## 2024-11-25 RX ORDER — SIMVASTATIN 40 MG/1
40 TABLET, FILM COATED ORAL NIGHTLY
Qty: 90 TABLET | Refills: 1 | OUTPATIENT
Start: 2024-11-25

## 2024-11-25 RX ORDER — TRAZODONE HYDROCHLORIDE 100 MG/1
100 TABLET ORAL NIGHTLY PRN
Qty: 90 TABLET | Refills: 1 | OUTPATIENT
Start: 2024-11-25

## 2024-11-27 ENCOUNTER — APPOINTMENT (OUTPATIENT)
Dept: OPHTHALMOLOGY | Facility: CLINIC | Age: 74
End: 2024-11-27
Payer: MEDICARE

## 2024-12-12 ASSESSMENT — ENCOUNTER SYMPTOMS
PARESIS: 0
BACK PAIN: 1
FEVER: 0
DYSURIA: 0
WEAKNESS: 1
BOWEL INCONTINENCE: 0
TINGLING: 0
WEIGHT LOSS: 0
PARESTHESIAS: 0
NUMBNESS: 0
ABDOMINAL PAIN: 0
PERIANAL NUMBNESS: 0
LEG PAIN: 1
HEADACHES: 0

## 2024-12-13 ENCOUNTER — APPOINTMENT (OUTPATIENT)
Dept: PRIMARY CARE | Facility: CLINIC | Age: 74
End: 2024-12-13
Payer: MEDICARE

## 2025-01-10 ENCOUNTER — HOSPITAL ENCOUNTER (OUTPATIENT)
Dept: RADIOLOGY | Facility: CLINIC | Age: 75
Discharge: HOME | End: 2025-01-10
Payer: MEDICARE

## 2025-01-10 DIAGNOSIS — Z12.31 ENCOUNTER FOR SCREENING MAMMOGRAM FOR BREAST CANCER: ICD-10-CM

## 2025-01-10 PROCEDURE — 77067 SCR MAMMO BI INCL CAD: CPT

## 2025-02-04 ASSESSMENT — EXTERNAL EXAM - LEFT EYE: OS_EXAM: NORMAL

## 2025-02-04 ASSESSMENT — CUP TO DISC RATIO
OS_RATIO: .3
OD_RATIO: .3

## 2025-02-04 ASSESSMENT — EXTERNAL EXAM - RIGHT EYE: OD_EXAM: NORMAL

## 2025-02-04 NOTE — PROGRESS NOTES
LOV 9/27/23      Combined form of age-related cataract, right eyeH25.811  Combined form of age-related cataract, left eyeH25.812  -Lenstar (3/31/21)  -Not visually significant at this time. Monitor. Advised to call or return sooner if any change in vision.     Posterior vitreous detachment, bilateral  -No retinal tear or detachment seen on exam. Retinal detachment symptoms discussed.     Ptosis of right ztvlxeQ74.401  Ptosis of left ohraqhX85.402  Dermatochalasis of left ejenneN58.836  PtowyuqbpsjtowmB27.89  Brow ptosis, lyrkgscpfF38.813  -History of ptosis repair ~ December 2014. Still with bothersome/symptomatic ptosis/dermatochalasis.   -Was seen by Dr. Hudson 7/10/19 and 3/30/22 and recommended to have bilateral upper eyelid blepharoplasty and consideration of bilateral lower eyelid blepharoplasty and endoscopic or pretrichial brow lift. Patient has had evaluation with ENT dept as well as with Dr. Hudson.   -Now s/p 4 lid surgery with Dr. Siomara Case - Nov 2023. -May use artificial tears PRN.     Astigmatism of both eyes with tauinlzgdxA04.203  -New Rx for glasses given per patient request - optional to fill. Patient's signature obtained to acknowledge and confirm that a paper copy of glasses Rx was given to patient in compliance with [facility] Eyeglass Rule. Electronic copy of Rx will also be available via [employer].         No history of intraocular surgery/refractive surgery.   No FH of AMD/glaucoma  (+)FH Ocular myasthenia gravis - father

## 2025-02-05 ENCOUNTER — APPOINTMENT (OUTPATIENT)
Dept: OPHTHALMOLOGY | Age: 75
End: 2025-02-05
Payer: MEDICARE

## 2025-02-05 DIAGNOSIS — H25.812 COMBINED FORM OF AGE-RELATED CATARACT, LEFT EYE: ICD-10-CM

## 2025-02-05 DIAGNOSIS — H25.811 COMBINED FORM OF AGE-RELATED CATARACT, RIGHT EYE: Primary | ICD-10-CM

## 2025-02-05 DIAGNOSIS — H02.89 STEATOBLEPHARON: ICD-10-CM

## 2025-02-05 DIAGNOSIS — H52.203 ASTIGMATISM OF BOTH EYES, UNSPECIFIED TYPE: ICD-10-CM

## 2025-02-05 DIAGNOSIS — H43.813 PVD (POSTERIOR VITREOUS DETACHMENT), BOTH EYES: ICD-10-CM

## 2025-02-05 DIAGNOSIS — H02.403 PTOSIS OF BOTH EYELIDS: ICD-10-CM

## 2025-02-05 DIAGNOSIS — H02.834 DERMATOCHALASIS OF BOTH UPPER EYELIDS: ICD-10-CM

## 2025-02-05 DIAGNOSIS — H52.4 PRESBYOPIA: ICD-10-CM

## 2025-02-05 DIAGNOSIS — H57.813 BROW PTOSIS, BILATERAL: ICD-10-CM

## 2025-02-05 DIAGNOSIS — H02.831 DERMATOCHALASIS OF BOTH UPPER EYELIDS: ICD-10-CM

## 2025-02-05 PROCEDURE — 92015 DETERMINE REFRACTIVE STATE: CPT | Performed by: OPHTHALMOLOGY

## 2025-02-05 PROCEDURE — 92014 COMPRE OPH EXAM EST PT 1/>: CPT | Performed by: OPHTHALMOLOGY

## 2025-02-05 ASSESSMENT — REFRACTION_MANIFEST
OD_CYLINDER: -0.50
OS_CYLINDER: -0.50
OS_ADD: +2.50
OD_AXIS: 105
OS_SPHERE: PLANO
OD_ADD: +2.50
OD_SPHERE: -0.25
OS_AXIS: 080

## 2025-02-05 ASSESSMENT — TONOMETRY
OS_IOP_MMHG: 13
OD_IOP_MMHG: 13
IOP_METHOD: GOLDMANN APPLANATION

## 2025-02-05 ASSESSMENT — ENCOUNTER SYMPTOMS
RESPIRATORY NEGATIVE: 0
ENDOCRINE NEGATIVE: 0
EYES NEGATIVE: 1
MUSCULOSKELETAL NEGATIVE: 0
CONSTITUTIONAL NEGATIVE: 0
CARDIOVASCULAR NEGATIVE: 0
GASTROINTESTINAL NEGATIVE: 0
PSYCHIATRIC NEGATIVE: 0
ALLERGIC/IMMUNOLOGIC NEGATIVE: 0
NEUROLOGICAL NEGATIVE: 0
HEMATOLOGIC/LYMPHATIC NEGATIVE: 0

## 2025-02-05 ASSESSMENT — CONF VISUAL FIELD
OS_INFERIOR_TEMPORAL_RESTRICTION: 0
OS_SUPERIOR_NASAL_RESTRICTION: 0
OD_SUPERIOR_NASAL_RESTRICTION: 0
OS_NORMAL: 1
OD_INFERIOR_NASAL_RESTRICTION: 0
OD_NORMAL: 1
OD_SUPERIOR_TEMPORAL_RESTRICTION: 0
OS_INFERIOR_NASAL_RESTRICTION: 0
OD_INFERIOR_TEMPORAL_RESTRICTION: 0
OS_SUPERIOR_TEMPORAL_RESTRICTION: 0

## 2025-02-05 ASSESSMENT — VISUAL ACUITY
OD_CC: 20/20
OS_CC: 20/20
OD_CC+: -2
METHOD: SNELLEN - LINEAR
CORRECTION_TYPE: GLASSES

## 2025-02-05 ASSESSMENT — SLIT LAMP EXAM - LIDS
COMMENTS: GOOD POSITION
COMMENTS: GOOD POSITION

## 2025-02-05 ASSESSMENT — REFRACTION_WEARINGRX
OS_SPHERE: -0.25
OD_SPHERE: -0.25
OD_ADD: +2.50
OD_AXIS: 105
OS_AXIS: 080
OS_ADD: +2.50
OD_CYLINDER: -0.50
OS_CYLINDER: -0.50

## 2025-02-12 ENCOUNTER — APPOINTMENT (OUTPATIENT)
Dept: VASCULAR SURGERY | Facility: CLINIC | Age: 75
End: 2025-02-12
Payer: MEDICARE

## 2025-02-12 VITALS
HEIGHT: 66 IN | HEART RATE: 63 BPM | WEIGHT: 193.1 LBS | DIASTOLIC BLOOD PRESSURE: 85 MMHG | BODY MASS INDEX: 31.03 KG/M2 | SYSTOLIC BLOOD PRESSURE: 130 MMHG | RESPIRATION RATE: 18 BRPM

## 2025-02-12 DIAGNOSIS — R60.0 EDEMA OF BOTH LOWER LEGS DUE TO PERIPHERAL VENOUS INSUFFICIENCY: Primary | ICD-10-CM

## 2025-02-12 DIAGNOSIS — I83.893 VARICOSE VEINS OF BILATERAL LOWER EXTREMITIES WITH OTHER COMPLICATIONS: ICD-10-CM

## 2025-02-12 DIAGNOSIS — I87.2 EDEMA OF BOTH LOWER LEGS DUE TO PERIPHERAL VENOUS INSUFFICIENCY: Primary | ICD-10-CM

## 2025-02-12 DIAGNOSIS — I87.023 POSTTHROMBOTIC SYNDROME OF BOTH LOWER EXTREMITIES WITH INFLAMMATION: ICD-10-CM

## 2025-02-12 PROCEDURE — 99204 OFFICE O/P NEW MOD 45 MIN: CPT | Performed by: SURGERY

## 2025-02-12 PROCEDURE — 1036F TOBACCO NON-USER: CPT | Performed by: SURGERY

## 2025-02-12 PROCEDURE — 1123F ACP DISCUSS/DSCN MKR DOCD: CPT | Performed by: SURGERY

## 2025-02-12 PROCEDURE — 1125F AMNT PAIN NOTED PAIN PRSNT: CPT | Performed by: SURGERY

## 2025-02-12 PROCEDURE — 1159F MED LIST DOCD IN RCRD: CPT | Performed by: SURGERY

## 2025-02-12 ASSESSMENT — PAIN SCALES - GENERAL: PAINLEVEL_OUTOF10: 6

## 2025-02-12 ASSESSMENT — PATIENT HEALTH QUESTIONNAIRE - PHQ9
SUM OF ALL RESPONSES TO PHQ9 QUESTIONS 1 AND 2: 1
2. FEELING DOWN, DEPRESSED OR HOPELESS: NOT AT ALL
1. LITTLE INTEREST OR PLEASURE IN DOING THINGS: SEVERAL DAYS

## 2025-02-12 ASSESSMENT — ENCOUNTER SYMPTOMS
LOSS OF SENSATION IN FEET: 0
DEPRESSION: 0
OCCASIONAL FEELINGS OF UNSTEADINESS: 1

## 2025-02-12 NOTE — PATIENT INSTRUCTIONS
It was a pleasure taking care of you today and appreciate your seeing us at our [facility]    Based on your evaluation, I am recommeding the followin) I would recommend right GSV RFA followed by two sessions of varithena to the left leg; after this we may need additional varithena to the right below knee tributaries that are present (we can reassess need for this over time).   2) Please bring your compression socks to the day of the procedure.   3) We will proceed with insurance approval and call you once we can proceed with scheduling your procedures.    Please call the office with any questions at [phone].   For Vein Center specific questions, particularly insurance related questions of booking your [place] intervention, you can call [phone] or email at [email or web address]    You can speak directly to my Vein Center Nurse Coordinator, Shannan Gillespie, for specific questions.       Elyse Wise MD, MHS, RPVI  , [facility] [employer]  Director, [facility], [facility]  Co-Director, Vascular Laboratories, [facility]  Division of Vascular Surgery and Endovascular Therapy  [facility]

## 2025-02-12 NOTE — PROGRESS NOTES
"NPV REASON: Post Thrombotic Syndrome  Referring Provider: Dr. Joseph    CURRENT ENCOUNTER:  Zara Motta is 74 y.o. female here for NPV for Post Thrombotic syndrome.   recurrent VTE on long term AC with eliquis   Reports leg swelling - used the compression. Reports still has foot swelling.     Left foot started swelling 1 year +/-. Since starting - this is worse - relates this to not walking and being NWB right.   reports prev vein procedure RLE with Dr. Davidson (20 years ago).   ?recanalization. Maybe right leg.   Prior to that had sclerotherapy with Dr. Scherer    Has ache/heaviness/increase varicosities  Swelling over the day; about the same   No edema in the am  +throbbing/burning - over the day  No prior ulcers    recurrent VTE including PE  - this was idiopathic (?OCPs at that time). Rx warfarin x 6 months. Acute DVT RLE  - also idiopathic - was on warfarin and planned for indefinite therapy. Did well on warfarin without bleeding. Was doing home POCT. Had bunny TKA in  and this was complicated by LLE DVT. Warfarin was maintained. Has had bunny MINERVA - left approx 10 years ago. Right MINERVA -  - no VTE but AC was changed at that time to eliquis as noted. Right Foot and ankle fx 3/2024. Underwent ORIF - AC was not resumed post-op - did not have SCDs, no lovenox or UFH - reports AC held for approx 6 days. Dx with RLE calf vein DVT approx 2 weeks after surgery - was back on AC at that time. By prev reports - has had thrombophilia testing and this was normal - no records available.     Bilateral hip and knee replacements  Feels that the right hip is weaker than left  Had b/l hips and knees done  No prior deep venous intervention  All DVT were in lower legs per the patient.     Wears compressions socks daily and 'gets a little\" relief but sx persist   - no complications; oldes is 49 y/o; second child is 46 y/o.     RIGHT LEG C4b Lipodermatosclerosis or atrophie ted  RIGHT LEG PAIN 1, VARICOSE VEINS " 2, VENOUS EDEMA 1, SKIN PIGMENTATION 2, and USE OF COMPRESSION 3  RIGHT LEG CEAP: C4b  RIGHT LEG VCSS SCORE: 9    LEFT LEG C3 Edema  LEFT LEG PAIN 1, VARICOSE VEINS 3, VENOUS EDEMA 1, and USE OF COMPRESSION 3  LEFT LEG CEAP: C3  LEFT LEG VCSS SCORE: 8    PastMedHX:  Past Medical History:   Diagnosis Date    Achilles tendinitis, unspecified leg 09/14/2015    Achilles tendonitis    Acute embolism and thrombosis of unspecified deep veins of unspecified lower extremity (Multi) 04/04/2016    Acute embolism and thrombosis of unspecified deep veins of unspecified lower extremity    Age-related nuclear cataract, left eye 01/07/2015    Age-related nuclear cataract of left eye    Age-related nuclear cataract, right eye 01/07/2015    Age-related nuclear cataract of right eye    Age-related nuclear cataract, right eye 11/20/2014    Age-related nuclear cataract of right eye    Age-related nuclear cataract, right eye 06/16/2017    Age-related nuclear cataract, right    Arthritis     Depression, unspecified 11/13/2019    Chronic depression    Dermatochalasis of left eye, unspecified eyelid 05/01/2019    Dermatochalasis of left eyelid    Dermatochalasis of unspecified eye, unspecified eyelid 11/20/2014    Dermatochalasis    Dorsalgia, unspecified 04/30/2018    Mid back pain    Dyspepsia 05/16/2023    Encounter for screening mammogram for malignant neoplasm of breast 08/27/2014    Visit for screening mammogram    GERD (gastroesophageal reflux disease)     Hyperlipidemia, unspecified     Dyslipidemia    Kidney stone     Long term (current) use of anticoagulants 01/07/2015    Chronic anticoagulation    Morbid (severe) obesity due to excess calories (Multi) 01/05/2017    Morbid obesity due to excess calories    Other fecal abnormalities 09/12/2020    Positive colorectal cancer screening using Cologuard test    Other specified disorders of bladder 09/14/2015    Bladder irritability    Other specified disorders of eustachian tube,  bilateral 09/14/2015    Dysfunction of both eustachian tubes    Other visual disturbances 01/07/2015    Blurred vision, bilateral    Personal history of other diseases of the circulatory system 06/19/2020    History of intermittent claudication    Personal history of other diseases of the respiratory system 12/04/2014    History of acute bronchitis    Personal history of other diseases of the respiratory system 02/16/2017    History of acute sinusitis    Personal history of other drug therapy 10/22/2018    History of influenza vaccination    Personal history of other mental and behavioral disorders 04/17/2015    History of depression    Personal history of other specified conditions 04/03/2018    History of dysuria    Personal history of other specified conditions 04/03/2018    History of urinary frequency    Personal history of other specified conditions 11/29/2018    History of heartburn    Personal history of other specified conditions 10/27/2015    History of headache    Personal history of pulmonary embolism 05/15/2019    History of pulmonary embolism    Prediabetes 05/16/2023    Pulmonary embolism     Right inguinal pain 05/16/2023    Sciatica, left side 08/06/2018    Sciatica of left side    Stage 3a chronic kidney disease (Multi) 05/16/2023    Strain of hip flexor 05/16/2023    Unspecified disorder of refraction 01/07/2015    Refractive errors    Unspecified ptosis of left eyelid 06/16/2017    Ptosis of left eyelid    Unspecified ptosis of right eyelid 11/20/2014    Ptosis of right eyelid    Unspecified ptosis of right eyelid 06/16/2017    Ptosis of right eyelid    Unspecified symptoms and signs involving the genitourinary system 04/03/2018    UTI symptoms    Urinary tract infection     Urinary tract infection, site not specified 04/03/2018    Acute UTI       Meds:     Current Outpatient Medications:     acetaminophen (Tylenol) 325 mg tablet, TAKE THREE TABLETS EVERY 8 HOURS AS NEEDED FOR PAIN, Disp: , Rfl:      apixaban (Eliquis) 5 mg tablet, Take 1 tablet (5 mg) by mouth 2 times a day., Disp: 200 tablet, Rfl: 0    buPROPion XL (Wellbutrin XL) 150 mg 24 hr tablet, Take 1 tablet (150 mg) by mouth once daily. as directed, Disp: 100 tablet, Rfl: 0    ezetimibe (Zetia) 10 mg tablet, Take 1 tablet (10 mg) by mouth once daily., Disp: 100 tablet, Rfl: 0    oxybutynin XL (Ditropan-XL) 10 mg 24 hr tablet, Take 1 tablet (10 mg) by mouth once daily., Disp: 100 tablet, Rfl: 0    pantoprazole (ProtoNix) 40 mg EC tablet, Take 1 tablet (40 mg) by mouth once daily as needed (gerd). Do not crush, chew, or split., Disp: 100 tablet, Rfl: 0    simvastatin (Zocor) 40 mg tablet, Take 1 tablet (40 mg) by mouth once daily at bedtime., Disp: 100 tablet, Rfl: 0    traZODone (Desyrel) 100 mg tablet, Take 1 tablet (100 mg) by mouth as needed at bedtime for sleep., Disp: 100 tablet, Rfl: 0    methocarbamol (Robaxin) 500 mg tablet, Take 1 tablet (500 mg) by mouth every 6 hours if needed for muscle spasms. (Patient not taking: Reported on 2/12/2025), Disp: , Rfl:     Allergies:   Allergies   Allergen Reactions    Estrogens Unknown       ROS:  Review of Systems     Objective:  Vitals:  Vitals:    02/12/25 1420   BP: 130/85   Pulse: 63   Resp:         Exam:  No distress  Breathing comfortably   Not tachycardic  Abd soft, nt, nd  Bilateral legs with intact pulses   Bilateral perfused feet  BILATERAL mild leg edema  R>L LDS changes   L>R varices                      Labs:  Lab Results   Component Value Date    WBC 5.0 11/15/2024    WBC 4.4 06/21/2024    WBC 4.5 05/06/2024    HGB 12.1 11/15/2024    HGB 11.1 (L) 06/21/2024    HGB 10.7 (L) 05/06/2024    HCT 39.0 11/15/2024    HCT 36.0 06/21/2024    HCT 34.4 (L) 05/06/2024    MCV 89 11/15/2024    MCV 86 06/21/2024    MCV 88 05/06/2024     11/15/2024     Lab Results   Component Value Date    CREATININE 0.89 11/12/2024    CREATININE 0.84 05/06/2024    CREATININE 0.85 10/30/2023    BUN 15 11/12/2024     BUN 13 05/06/2024    BUN 20 10/30/2023     11/12/2024     05/06/2024     10/30/2023    K 4.0 11/12/2024    K 4.5 05/06/2024    K 4.2 10/30/2023     11/12/2024     05/06/2024     10/30/2023    CO2 30 11/12/2024    CO2 28 05/06/2024    CO2 30 10/30/2023         Imaging:            Assessment & Plan:  Zara Motta is 74 y.o. female here for NPV for Post Thrombotic syndrome.   recurrent VTE with PE starting in 2000 on long term AC with eliquis   reports prev vein procedure RLE with Dr. Davidson (20 years ago).   ?recanalization. Maybe right leg.   Prior to that had sclerotherapy with Dr. Scherer    Has ache/heaviness/increase varicosities  Swelling over the day; about the same - despite compression sock use  No edema in the am  +throbbing/burning - over the day  No prior ulcers    RIGHT LEG C4b Lipodermatosclerosis or atrophie ted  RIGHT LEG VCSS SCORE: 9  LEFT LEG C3 Edema  LEFT LEG VCSS SCORE: 8      1) I would recommend right GSV RFA followed by two sessions of varithena to the left leg; after this we may need additional varithena to the right below knee tributaries that are present (we can reassess need for this over time).     Elyse Wise MD, MHS, RPVI  , [employer]  Director, [facility], [facility] [place]  Co-Director, Vascular [facility], [facility] [place]  Division of Vascular Surgery and Endovascular Therapy  [facility]

## 2025-02-19 ENCOUNTER — APPOINTMENT (OUTPATIENT)
Dept: CARDIOLOGY | Facility: CLINIC | Age: 75
End: 2025-02-19
Payer: MEDICARE

## 2025-02-27 ENCOUNTER — APPOINTMENT (OUTPATIENT)
Dept: PRIMARY CARE | Facility: CLINIC | Age: 75
End: 2025-02-27
Payer: MEDICARE

## 2025-02-27 VITALS
WEIGHT: 189 LBS | HEART RATE: 68 BPM | SYSTOLIC BLOOD PRESSURE: 112 MMHG | DIASTOLIC BLOOD PRESSURE: 74 MMHG | RESPIRATION RATE: 12 BRPM | HEIGHT: 66 IN | OXYGEN SATURATION: 100 % | BODY MASS INDEX: 30.37 KG/M2

## 2025-02-27 DIAGNOSIS — N32.81 OAB (OVERACTIVE BLADDER): ICD-10-CM

## 2025-02-27 DIAGNOSIS — E78.5 HYPERLIPIDEMIA, UNSPECIFIED: ICD-10-CM

## 2025-02-27 DIAGNOSIS — Z86.718 HX OF DEEP VENOUS THROMBOSIS: ICD-10-CM

## 2025-02-27 DIAGNOSIS — F33.41 RECURRENT MAJOR DEPRESSIVE DISORDER, IN PARTIAL REMISSION (CMS-HCC): ICD-10-CM

## 2025-02-27 DIAGNOSIS — K21.9 GASTRO-ESOPHAGEAL REFLUX DISEASE WITHOUT ESOPHAGITIS: ICD-10-CM

## 2025-02-27 DIAGNOSIS — Z00.00 HEALTHCARE MAINTENANCE: Primary | ICD-10-CM

## 2025-02-27 PROCEDURE — 1036F TOBACCO NON-USER: CPT | Performed by: FAMILY MEDICINE

## 2025-02-27 PROCEDURE — 1158F ADVNC CARE PLAN TLK DOCD: CPT | Performed by: FAMILY MEDICINE

## 2025-02-27 PROCEDURE — 1170F FXNL STATUS ASSESSED: CPT | Performed by: FAMILY MEDICINE

## 2025-02-27 PROCEDURE — 1159F MED LIST DOCD IN RCRD: CPT | Performed by: FAMILY MEDICINE

## 2025-02-27 PROCEDURE — 1123F ACP DISCUSS/DSCN MKR DOCD: CPT | Performed by: FAMILY MEDICINE

## 2025-02-27 PROCEDURE — G0439 PPPS, SUBSEQ VISIT: HCPCS | Performed by: FAMILY MEDICINE

## 2025-02-27 RX ORDER — OXYBUTYNIN CHLORIDE 10 MG/1
10 TABLET, EXTENDED RELEASE ORAL DAILY
Qty: 100 TABLET | Refills: 0 | Status: SHIPPED | OUTPATIENT
Start: 2025-02-27 | End: 2025-02-28 | Stop reason: SDUPTHER

## 2025-02-27 RX ORDER — TRAZODONE HYDROCHLORIDE 100 MG/1
100 TABLET ORAL NIGHTLY PRN
Qty: 100 TABLET | Refills: 0 | Status: SHIPPED | OUTPATIENT
Start: 2025-02-27 | End: 2025-02-28 | Stop reason: SDUPTHER

## 2025-02-27 RX ORDER — SIMVASTATIN 40 MG/1
40 TABLET, FILM COATED ORAL NIGHTLY
Qty: 100 TABLET | Refills: 0 | Status: SHIPPED | OUTPATIENT
Start: 2025-02-27 | End: 2025-02-28 | Stop reason: SDUPTHER

## 2025-02-27 RX ORDER — EZETIMIBE 10 MG/1
10 TABLET ORAL DAILY
Qty: 100 TABLET | Refills: 0 | Status: SHIPPED | OUTPATIENT
Start: 2025-02-27 | End: 2025-02-28 | Stop reason: SDUPTHER

## 2025-02-27 RX ORDER — PANTOPRAZOLE SODIUM 40 MG/1
40 TABLET, DELAYED RELEASE ORAL DAILY PRN
Qty: 100 TABLET | Refills: 0 | Status: SHIPPED | OUTPATIENT
Start: 2025-02-27 | End: 2025-02-28 | Stop reason: SDUPTHER

## 2025-02-27 RX ORDER — BUPROPION HYDROCHLORIDE 150 MG/1
150 TABLET ORAL DAILY
Qty: 100 TABLET | Refills: 0 | Status: SHIPPED | OUTPATIENT
Start: 2025-02-27 | End: 2025-02-28 | Stop reason: SDUPTHER

## 2025-02-27 ASSESSMENT — ENCOUNTER SYMPTOMS
DEPRESSION: 0
APPETITE CHANGE: 0
SLEEP DISTURBANCE: 0
ARTHRALGIAS: 0
NAUSEA: 0
OCCASIONAL FEELINGS OF UNSTEADINESS: 0
ABDOMINAL DISTENTION: 0
NERVOUS/ANXIOUS: 0
HEADACHES: 0
DIFFICULTY URINATING: 0
DYSURIA: 0
MYALGIAS: 0
DYSPHORIC MOOD: 0
DIARRHEA: 0
CHEST TIGHTNESS: 0
CHILLS: 0
WHEEZING: 0
SINUS PRESSURE: 0
FATIGUE: 0
LIGHT-HEADEDNESS: 0
SHORTNESS OF BREATH: 0
LOSS OF SENSATION IN FEET: 0
ABDOMINAL PAIN: 0
ADENOPATHY: 0
BRUISES/BLEEDS EASILY: 0
FEVER: 0

## 2025-02-27 ASSESSMENT — PATIENT HEALTH QUESTIONNAIRE - PHQ9
5. POOR APPETITE OR OVEREATING: NOT AT ALL
SUM OF ALL RESPONSES TO PHQ QUESTIONS 1-9: 4
4. FEELING TIRED OR HAVING LITTLE ENERGY: NEARLY EVERY DAY
7. TROUBLE CONCENTRATING ON THINGS, SUCH AS READING THE NEWSPAPER OR WATCHING TELEVISION: NOT AT ALL
6. FEELING BAD ABOUT YOURSELF - OR THAT YOU ARE A FAILURE OR HAVE LET YOURSELF OR YOUR FAMILY DOWN: NOT AT ALL
9. THOUGHTS THAT YOU WOULD BE BETTER OFF DEAD, OR OF HURTING YOURSELF: NOT AT ALL
2. FEELING DOWN, DEPRESSED OR HOPELESS: NOT AT ALL
3. TROUBLE FALLING OR STAYING ASLEEP OR SLEEPING TOO MUCH: SEVERAL DAYS
SUM OF ALL RESPONSES TO PHQ9 QUESTIONS 1 AND 2: 0
8. MOVING OR SPEAKING SO SLOWLY THAT OTHER PEOPLE COULD HAVE NOTICED. OR THE OPPOSITE, BEING SO FIGETY OR RESTLESS THAT YOU HAVE BEEN MOVING AROUND A LOT MORE THAN USUAL: NOT AT ALL
1. LITTLE INTEREST OR PLEASURE IN DOING THINGS: NOT AT ALL

## 2025-02-27 ASSESSMENT — ACTIVITIES OF DAILY LIVING (ADL)
GROCERY_SHOPPING: INDEPENDENT
BATHING: INDEPENDENT
DOING_HOUSEWORK: INDEPENDENT
TAKING_MEDICATION: INDEPENDENT
DRESSING: INDEPENDENT
MANAGING_FINANCES: INDEPENDENT

## 2025-02-27 NOTE — PROGRESS NOTES
"Subjective   Patient ID: Zara Motta is a 74 y.o. female who presents for Medicare Annual Wellness Visit Subsequent.  PMHX, PSHx, Fam hx, and Social hx reviewed.   New concerns none  Vaccines RSV and Shingles vaccines recommended at pharmacy  Dentist seen at least yearly yes  Vision concerns none  Hearing concerns  none  Diet is usually overall healthy. Lost 11#in 6 months  Smoker - no  Lung CT/screening - NA  AAA screening - NA  Alcohol use - 0-1 drinks per wee  Exercising 0 days per week.   Colonoscopy 2023, current  Mammogram 2025 current  DEXA - 2023, currrent  ACP - advance directives, code status, and DPOA documentation discussed             Review of Systems   Constitutional:  Negative for appetite change, chills, fatigue and fever.   HENT:  Negative for congestion, ear pain and sinus pressure.    Eyes:  Negative for visual disturbance.   Respiratory:  Negative for chest tightness, shortness of breath and wheezing.    Cardiovascular:  Negative for chest pain.   Gastrointestinal:  Negative for abdominal distention, abdominal pain, diarrhea and nausea.   Genitourinary:  Negative for difficulty urinating, dysuria and pelvic pain.   Musculoskeletal:  Negative for arthralgias and myalgias.        Chronic foot pain since fx last year   Skin:  Negative for rash.   Allergic/Immunologic: Negative for immunocompromised state.   Neurological:  Negative for light-headedness and headaches.   Hematological:  Negative for adenopathy. Does not bruise/bleed easily.   Psychiatric/Behavioral:  Negative for dysphoric mood and sleep disturbance. The patient is not nervous/anxious.        Objective   /74   Pulse 68   Resp 12   Ht 1.676 m (5' 6\")   Wt 85.7 kg (189 lb)   LMP  (LMP Unknown)   SpO2 100%   BMI 30.51 kg/m²    Physical Exam  Constitutional:       General: She is not in acute distress.     Appearance: Normal appearance. She is not ill-appearing.   HENT:      Head: Normocephalic and atraumatic.      Right " Ear: Tympanic membrane, ear canal and external ear normal. There is no impacted cerumen.      Left Ear: Tympanic membrane, ear canal and external ear normal. There is no impacted cerumen.      Nose: Nose normal. No congestion.      Mouth/Throat:      Mouth: Mucous membranes are moist.      Pharynx: No oropharyngeal exudate or posterior oropharyngeal erythema.   Eyes:      Extraocular Movements: Extraocular movements intact.      Conjunctiva/sclera: Conjunctivae normal.   Neck:      Thyroid: No thyroid mass or thyromegaly.      Vascular: No carotid bruit.   Cardiovascular:      Rate and Rhythm: Normal rate and regular rhythm.      Heart sounds: Normal heart sounds. No murmur heard.  Pulmonary:      Breath sounds: Normal breath sounds. No wheezing, rhonchi or rales.   Abdominal:      General: Bowel sounds are normal. There is no distension.      Palpations: Abdomen is soft. There is no mass.      Tenderness: There is no abdominal tenderness.   Musculoskeletal:         General: No swelling or deformity.      Cervical back: Neck supple. No tenderness.      Right lower le+ Pitting Edema present.      Left lower le+ Pitting Edema present.   Lymphadenopathy:      Cervical: No cervical adenopathy.   Skin:     General: Skin is warm and dry.      Findings: No lesion or rash.   Neurological:      Mental Status: She is alert and oriented to person, place, and time.      Sensory: No sensory deficit.      Motor: No weakness.      Coordination: Coordination normal.      Deep Tendon Reflexes: Reflexes normal.   Psychiatric:         Mood and Affect: Mood normal.         Behavior: Behavior normal.         Judgment: Judgment normal.     Medicare Wellness Billing Compliance Satisfied    *This is a visual tool to show completion of required items on the day of the visit. Green checks will only appear on the date of visit.    Review all medications by prescribing practitioner or clinical pharmacist (such as prescriptions, OTCs,  herbal therapies and supplements) documented in the medical record    Past Medical, Surgical, and Family History reviewed and updated in chart    Tobacco Use Reviewed    Alcohol Use Reviewed    Illicit Drug Use Reviewed    PHQ2/9    Falls in Last Year Reviewed    Home Safety Risk Factors Reviewed    Cognitive Impairment Reviewed    Patient Self Assessment and Health Status    Current Diet Reviewed    Exercise Frequency    ADL - Hearing Impairment    ADL - Bathing    ADL - Dressing    ADL - Walks in Home    IADL - Managing Finances    IADL - Grocery Shopping    IADL - Taking Medications    IADL - Doing Housework          Assessment/Plan   Diagnoses and all orders for this visit:  Healthcare maintenance - RSV and Shingles vaccines recommended at pharmacy. Recent labs reviewed and discussed. Colonoscopy, Mammogram and DEXA are current.   Chronic R foot pain - continue to follow with orthopedics/podiatry as needed.   Doing excellent losing weight with diet.     Follow up in 3 months, 30mins

## 2025-02-27 NOTE — PATIENT INSTRUCTIONS

## 2025-02-27 NOTE — PROGRESS NOTES
"Subjective   Reason for Visit: Zara Motta is an 74 y.o. female here for a Medicare Wellness visit.     Past Medical, Surgical, and Family History reviewed and updated in chart.    Reviewed all medications by prescribing practitioner or clinical pharmacist (such as prescriptions, OTCs, herbal therapies and supplements) and documented in the medical record.    HPI    Patient Care Team:  Ha Damian MD as PCP - General  Ha Damian MD as PCP - O Medicare Advantage PCP     Review of Systems    Objective   Vitals:  /74   Pulse 68   Resp 12   Ht 1.676 m (5' 6\")   Wt 85.7 kg (189 lb)   LMP  (LMP Unknown)   SpO2 100%   BMI 30.51 kg/m²       Physical Exam    Assessment & Plan              "

## 2025-02-28 DIAGNOSIS — N32.81 OAB (OVERACTIVE BLADDER): ICD-10-CM

## 2025-02-28 DIAGNOSIS — K21.9 GASTRO-ESOPHAGEAL REFLUX DISEASE WITHOUT ESOPHAGITIS: ICD-10-CM

## 2025-02-28 DIAGNOSIS — Z86.718 HX OF DEEP VENOUS THROMBOSIS: ICD-10-CM

## 2025-02-28 DIAGNOSIS — F33.41 RECURRENT MAJOR DEPRESSIVE DISORDER, IN PARTIAL REMISSION (CMS-HCC): ICD-10-CM

## 2025-02-28 DIAGNOSIS — E78.5 HYPERLIPIDEMIA, UNSPECIFIED: ICD-10-CM

## 2025-02-28 RX ORDER — TRAZODONE HYDROCHLORIDE 100 MG/1
100 TABLET ORAL NIGHTLY PRN
Qty: 100 TABLET | Refills: 0 | Status: SHIPPED | OUTPATIENT
Start: 2025-02-28

## 2025-02-28 RX ORDER — SIMVASTATIN 40 MG/1
40 TABLET, FILM COATED ORAL NIGHTLY
Qty: 100 TABLET | Refills: 0 | Status: SHIPPED | OUTPATIENT
Start: 2025-02-28

## 2025-02-28 RX ORDER — PANTOPRAZOLE SODIUM 40 MG/1
40 TABLET, DELAYED RELEASE ORAL DAILY PRN
Qty: 100 TABLET | Refills: 0 | Status: SHIPPED | OUTPATIENT
Start: 2025-02-28

## 2025-02-28 RX ORDER — OXYBUTYNIN CHLORIDE 10 MG/1
10 TABLET, EXTENDED RELEASE ORAL DAILY
Qty: 100 TABLET | Refills: 0 | Status: SHIPPED | OUTPATIENT
Start: 2025-02-28

## 2025-02-28 RX ORDER — BUPROPION HYDROCHLORIDE 150 MG/1
150 TABLET ORAL DAILY
Qty: 100 TABLET | Refills: 0 | Status: SHIPPED | OUTPATIENT
Start: 2025-02-28

## 2025-02-28 RX ORDER — EZETIMIBE 10 MG/1
10 TABLET ORAL DAILY
Qty: 100 TABLET | Refills: 0 | Status: SHIPPED | OUTPATIENT
Start: 2025-02-28

## 2025-03-05 ENCOUNTER — APPOINTMENT (OUTPATIENT)
Dept: CARDIOLOGY | Facility: CLINIC | Age: 75
End: 2025-03-05
Payer: MEDICARE

## 2025-03-17 ENCOUNTER — APPOINTMENT (OUTPATIENT)
Dept: URGENT CARE | Age: 75
End: 2025-03-17
Payer: MEDICARE

## 2025-03-17 ENCOUNTER — OFFICE VISIT (OUTPATIENT)
Dept: URGENT CARE | Age: 75
End: 2025-03-17
Payer: MEDICARE

## 2025-03-17 VITALS — HEART RATE: 56 BPM | OXYGEN SATURATION: 99 %

## 2025-03-17 DIAGNOSIS — R35.0 URINARY FREQUENCY: Primary | ICD-10-CM

## 2025-03-17 LAB
POC APPEARANCE, URINE: ABNORMAL
POC BILIRUBIN, URINE: NEGATIVE
POC BLOOD, URINE: NEGATIVE
POC COLOR, URINE: YELLOW
POC GLUCOSE, URINE: NEGATIVE MG/DL
POC KETONES, URINE: NEGATIVE MG/DL
POC LEUKOCYTES, URINE: NEGATIVE
POC NITRITE,URINE: NEGATIVE
POC PH, URINE: 6 PH
POC PROTEIN, URINE: NEGATIVE MG/DL
POC SPECIFIC GRAVITY, URINE: 1.01
POC UROBILINOGEN, URINE: 0.2 EU/DL

## 2025-03-17 PROCEDURE — 1123F ACP DISCUSS/DSCN MKR DOCD: CPT | Performed by: NURSE PRACTITIONER

## 2025-03-17 PROCEDURE — 1159F MED LIST DOCD IN RCRD: CPT | Performed by: NURSE PRACTITIONER

## 2025-03-17 PROCEDURE — 99213 OFFICE O/P EST LOW 20 MIN: CPT | Performed by: NURSE PRACTITIONER

## 2025-03-17 PROCEDURE — 1036F TOBACCO NON-USER: CPT | Performed by: NURSE PRACTITIONER

## 2025-03-17 PROCEDURE — 81003 URINALYSIS AUTO W/O SCOPE: CPT | Performed by: NURSE PRACTITIONER

## 2025-03-17 NOTE — PROGRESS NOTES
Subjective   Patient ID: Zara Motta is a 74 y.o. female. They present today with a chief complaint of Urinary Frequency.    History of Present Illness  74-year-old female coming in with complaints of urinary frequency and bladder pressure.  She states this been going on for the last couple of days.  She states she has had back pain but she always has back pain.  She states she has had some chills but no fevers.  She denies any nausea or vomiting.  She denies any other complaints today.    Past Medical History  Allergies as of 03/17/2025 - Reviewed 03/17/2025   Allergen Reaction Noted    Estrogens Unknown 05/16/2023       (Not in a hospital admission)       Past Medical History:   Diagnosis Date    Achilles tendinitis, unspecified leg 09/14/2015    Achilles tendonitis    Acute embolism and thrombosis of unspecified deep veins of unspecified lower extremity (Multi) 04/04/2016    Acute embolism and thrombosis of unspecified deep veins of unspecified lower extremity    Age-related nuclear cataract, left eye 01/07/2015    Age-related nuclear cataract of left eye    Age-related nuclear cataract, right eye 01/07/2015    Age-related nuclear cataract of right eye    Age-related nuclear cataract, right eye 11/20/2014    Age-related nuclear cataract of right eye    Age-related nuclear cataract, right eye 06/16/2017    Age-related nuclear cataract, right    Arthritis     Depression, unspecified 11/13/2019    Chronic depression    Dermatochalasis of left eye, unspecified eyelid 05/01/2019    Dermatochalasis of left eyelid    Dermatochalasis of unspecified eye, unspecified eyelid 11/20/2014    Dermatochalasis    Dorsalgia, unspecified 04/30/2018    Mid back pain    Dyspepsia 05/16/2023    Encounter for screening mammogram for malignant neoplasm of breast 08/27/2014    Visit for screening mammogram    GERD (gastroesophageal reflux disease)     Hyperlipidemia, unspecified     Dyslipidemia    Kidney stone     Long term  (current) use of anticoagulants 01/07/2015    Chronic anticoagulation    Morbid (severe) obesity due to excess calories (Multi) 01/05/2017    Morbid obesity due to excess calories    Other fecal abnormalities 09/12/2020    Positive colorectal cancer screening using Cologuard test    Other specified disorders of bladder 09/14/2015    Bladder irritability    Other specified disorders of eustachian tube, bilateral 09/14/2015    Dysfunction of both eustachian tubes    Other visual disturbances 01/07/2015    Blurred vision, bilateral    Personal history of other diseases of the circulatory system 06/19/2020    History of intermittent claudication    Personal history of other diseases of the respiratory system 12/04/2014    History of acute bronchitis    Personal history of other diseases of the respiratory system 02/16/2017    History of acute sinusitis    Personal history of other drug therapy 10/22/2018    History of influenza vaccination    Personal history of other mental and behavioral disorders 04/17/2015    History of depression    Personal history of other specified conditions 04/03/2018    History of dysuria    Personal history of other specified conditions 04/03/2018    History of urinary frequency    Personal history of other specified conditions 11/29/2018    History of heartburn    Personal history of other specified conditions 10/27/2015    History of headache    Personal history of pulmonary embolism 05/15/2019    History of pulmonary embolism    Prediabetes 05/16/2023    Pulmonary embolism     Right inguinal pain 05/16/2023    Sciatica, left side 08/06/2018    Sciatica of left side    Stage 3a chronic kidney disease (Multi) 05/16/2023    Strain of hip flexor 05/16/2023    Unspecified disorder of refraction 01/07/2015    Refractive errors    Unspecified ptosis of left eyelid 06/16/2017    Ptosis of left eyelid    Unspecified ptosis of right eyelid 11/20/2014    Ptosis of right eyelid    Unspecified ptosis  of right eyelid 06/16/2017    Ptosis of right eyelid    Unspecified symptoms and signs involving the genitourinary system 04/03/2018    UTI symptoms    Urinary tract infection     Urinary tract infection, site not specified 04/03/2018    Acute UTI       Past Surgical History:   Procedure Laterality Date    COSMETIC SURGERY  11/8/2023    EYE SURGERY Right 11/03/2023    FRACTURE SURGERY  03/15/2024    HIP SURGERY  10/24/2013    Hip Surgery    JOINT REPLACEMENT  11/18/2022    KNEE SURGERY  10/24/2013    Knee Surgery    TOTAL HIP ARTHROPLASTY Bilateral 09/24/2013    Hip Replacement    TOTAL KNEE ARTHROPLASTY Bilateral 09/24/2013    Total Knee Arthroplasty        reports that she has never smoked. She has never used smokeless tobacco. She reports current alcohol use of about 1.0 standard drink of alcohol per week. She reports that she does not use drugs.    Review of Systems  Review of Systems:  General: No weight loss, fatigue, anorexia, insomnia, fever, chills.  Cardiac: No chest pain, palpitations, syncope, near syncope.  Pulmonary:  No shortness of breath, cough, hemoptysis  Heme/lymph: No swollen glands, fever, bleeding  : No discharge, positive dysuria, frequency, no urgency, hematuria  Musculoskeletal: No limb pain, joint pain, joint swelling.  Skin: No rashes  Neuro: No numbness, tingling, headaches                                 Objective    Vitals:    03/17/25 1423   Pulse: 56   SpO2: 99%     No LMP recorded (lmp unknown). Patient is postmenopausal.    Physical Exam  Physical Exam:  General: Vital noted, no distress. Afebrile  Cardiac: Regular rate and rhythm, no murmur  Pulmonary: Lungs clear bilaterally with good aeration. No adventitious breath sounds.  Abdomen: Soft, nontender, nonsurgical. No peritoneal signs. Normoactive bowel sounds.   Musculoskeletal: No peripheral edema.   Skin: No rashes    Procedures    Point of Care Test & Imaging Results from this visit  Results for orders placed or performed in  visit on 03/17/25   POCT UA Automated manually resulted   Result Value Ref Range    POC Color, Urine Yellow Straw, Yellow, Light-Yellow    POC Appearance, Urine Cloudy (A) Clear    POC Glucose, Urine NEGATIVE NEGATIVE mg/dl    POC Bilirubin, Urine NEGATIVE NEGATIVE    POC Ketones, Urine NEGATIVE NEGATIVE mg/dl    POC Specific Gravity, Urine 1.010 1.005 - 1.035    POC Blood, Urine NEGATIVE NEGATIVE    POC PH, Urine 6.0 No Reference Range Established PH    POC Protein, Urine NEGATIVE NEGATIVE mg/dl    POC Urobilinogen, Urine 0.2 0.2, 1.0 EU/DL    Poc Nitrite, Urine NEGATIVE NEGATIVE    POC Leukocytes, Urine NEGATIVE NEGATIVE      No results found.    Diagnostic study results (if any) were reviewed by LANCE Simpson.    Assessment/Plan   Allergies, medications, history, and pertinent labs/EKGs/Imaging reviewed by LANCE Simpson.     Medical Decision Making  Testing: UA, urine culture  Treatment: Encouraged patient to continue drinking fluids and monitor symptoms  Differential: 1) uti , 2) dysuria, 3) urinary frequency  Plan: Patient will follow up with the PCP in the next 2-3 days. Return for any worsening symptoms or go to the ER for further evaluation. Patient understands return precautions and discharge insturctions.  Impression:   1) urinary frequency      Orders and Diagnoses  Diagnoses and all orders for this visit:  Urinary frequency  -     POCT UA Automated manually resulted      Medical Admin Record      Patient disposition: Home    Electronically signed by LANCE Simpson  2:41 PM

## 2025-03-19 LAB — BACTERIA UR CULT: NORMAL

## 2025-04-15 ENCOUNTER — APPOINTMENT (OUTPATIENT)
Dept: PRIMARY CARE | Facility: CLINIC | Age: 75
End: 2025-04-15
Payer: MEDICARE

## 2025-04-29 ENCOUNTER — PROCEDURE VISIT (OUTPATIENT)
Dept: VASCULAR SURGERY | Facility: CLINIC | Age: 75
End: 2025-04-29
Payer: MEDICARE

## 2025-04-29 VITALS
BODY MASS INDEX: 30.05 KG/M2 | HEART RATE: 73 BPM | HEIGHT: 66 IN | DIASTOLIC BLOOD PRESSURE: 87 MMHG | WEIGHT: 187 LBS | SYSTOLIC BLOOD PRESSURE: 134 MMHG | RESPIRATION RATE: 20 BRPM

## 2025-04-29 DIAGNOSIS — I87.2 EDEMA OF RIGHT LOWER EXTREMITY DUE TO PERIPHERAL VENOUS INSUFFICIENCY: Primary | ICD-10-CM

## 2025-04-29 PROCEDURE — 36475 ENDOVENOUS RF 1ST VEIN: CPT | Performed by: SURGERY

## 2025-04-29 ASSESSMENT — COLUMBIA-SUICIDE SEVERITY RATING SCALE - C-SSRS
1. IN THE PAST MONTH, HAVE YOU WISHED YOU WERE DEAD OR WISHED YOU COULD GO TO SLEEP AND NOT WAKE UP?: NO
2. HAVE YOU ACTUALLY HAD ANY THOUGHTS OF KILLING YOURSELF?: NO
6. HAVE YOU EVER DONE ANYTHING, STARTED TO DO ANYTHING, OR PREPARED TO DO ANYTHING TO END YOUR LIFE?: NO

## 2025-04-29 ASSESSMENT — PAIN SCALES - GENERAL: PAINLEVEL_OUTOF10: 0-NO PAIN

## 2025-04-29 NOTE — PROGRESS NOTES
Zara Motta was brought into the PROCEDURAL ROOM room.   After timeout and verification of name, , mrn and allergies, the procedure was initiated.   Verification was additionally obtained by reviewing pre-procedural venous insufficiency studies.     The right leg was prepped and draped in standard fashion with chlorhexadine.   On table Duplex ultrasound was used to map out the insufficient saphenous vein, rule out any new changes at the groin level and access was determined. The GSV appeared to join the deep system in the OTHER. PATIENT HAS H/O ENDOVENOUS PROCEDURE, REMOTE. ON DETAILED POCUS EVALUATION OF THE RIGHT GSV - THERE WAS A CLOSED SEGMENT CLOSE TO THE SFJ AND THEN RECANNALIZED AND THEN RECLOSED AGAIN. THESE SEGMENTS WERE ABLATED TODAY TO ACHIEVE CLOSURE OF ENTIRE LEG REFLUXING RECANNALIZED GSV.     Physical Exam  Musculoskeletal:        Legs:          Ultrasound guidance was again used to localize the access site.   This was at the MID THIGH AND MID CALF level. TUMESCENT ANESTHESIA was injected as a local anesthetic in the subcutaneous tissues at the target location. Using ultrasound guidance, access was gained at with the 19 gauge thin walled access needle and followed by introduction of a short guidewire, location confirmed with ultrasound. a micropuncture was used first to facilitate access. A small, 2 mm incision was made at the access site to allow for introduction and placement of the 6 Fr x7cm - PLACED AT THE TWO SITES introducer/dilator. The dilator and guidewire were removed. The 8/60 cm RFA probe device was then inserted up along the ____GSV to the UPPER THIGH RECANNALIZED VEIN level under US. . CATHETER WAS PLACED ~2.5CM FROM THE SFJ.  We then proceeded with tumescent anesthesia for a total of 450 cc of volume under US guidance. TIP OF CATHETER WAS RECHECKED AND CONFIRMED TO NOT HAVE MOVED.  RFA was performed twice cephalad and 9 cycles were performed for a total of 3:00 MINUTES.       TREATMENT LENGTH: 22cm and 45.5cm      Following this, the sheath and catheter was removed and hemostasis was achieved with manual compression. steri strips were applied over the wound.  gauze and tegaderm were applied over this. Ultrasound confirmed complete coaptation and closure of the treated segments of the GSV, and the absence of any DVT as well.    The drapes were removed and the patient cleaned and prepared for discharge.   Post op ultrasound check is scheduled for 48-72 hours and the patient was given written post-op instructions.    STAFF: RADHA WISE KHANDEL, GORCZYNSKI, KNAPP  START: 1512  END:  1541    NOTABLE FINDINGS: SOME TRIBUTARIES IN THE DISTAL CALF MAY BENEFIT FROM VARITHENA DOWN THE ROAD IF REMAINS SYMPTOMATIC ON THE RIGHT. WILL MOVE ON TO THE LEFT AT NEXT SESSION.         Elyse Wise MD, MHS, RPVI  , [facility] [employer] [facility] [employer]  Director, Center for Comprehensive Venous Care, [facility] [place]  Co-Director, Vascular Laboratories, [facility] [place]  Division of Vascular Surgery and Endovascular Therapy  [facility]

## 2025-05-05 ENCOUNTER — HOSPITAL ENCOUNTER (OUTPATIENT)
Dept: VASCULAR MEDICINE | Facility: CLINIC | Age: 75
Discharge: HOME | End: 2025-05-05
Payer: MEDICARE

## 2025-05-05 DIAGNOSIS — R60.0 LOCALIZED EDEMA: ICD-10-CM

## 2025-05-05 DIAGNOSIS — I83.893 VARICOSE VEINS OF BILATERAL LOWER EXTREMITIES WITH OTHER COMPLICATIONS: ICD-10-CM

## 2025-05-05 PROCEDURE — 93971 EXTREMITY STUDY: CPT | Performed by: STUDENT IN AN ORGANIZED HEALTH CARE EDUCATION/TRAINING PROGRAM

## 2025-05-05 PROCEDURE — 93971 EXTREMITY STUDY: CPT

## 2025-05-13 ENCOUNTER — PROCEDURE VISIT (OUTPATIENT)
Dept: VASCULAR SURGERY | Facility: CLINIC | Age: 75
End: 2025-05-13
Payer: MEDICARE

## 2025-05-13 VITALS
HEART RATE: 65 BPM | DIASTOLIC BLOOD PRESSURE: 84 MMHG | RESPIRATION RATE: 16 BRPM | SYSTOLIC BLOOD PRESSURE: 132 MMHG | OXYGEN SATURATION: 96 % | WEIGHT: 184.8 LBS | BODY MASS INDEX: 29.83 KG/M2

## 2025-05-13 DIAGNOSIS — I83.812 VARICOSE VEINS OF LEFT LOWER EXTREMITY WITH PAIN: Primary | ICD-10-CM

## 2025-05-13 PROCEDURE — 36465 NJX NONCMPND SCLRSNT 1 VEIN: CPT | Performed by: SURGERY

## 2025-05-13 ASSESSMENT — ENCOUNTER SYMPTOMS
OCCASIONAL FEELINGS OF UNSTEADINESS: 0
LOSS OF SENSATION IN FEET: 0
DEPRESSION: 0

## 2025-05-13 NOTE — PROGRESS NOTES
Patient was seen today for administration of VARITHENA to left leg at the level of  medial and lateral calf.     The patient was given an explanation of the protocol for administering Varithena risks and benefits were explained to the patient. After consent was obtained, the patient was positioned on the exam table in reverse trendelenberg and the left leg was prepped and draped (after tributaries and perforators were identified and marked). The veins requiring treatment were identified and measured/marked. The vein was cannulated using US guidance and venous access was confirmed. Once access was obtained, the patient was placed in steep trendelenberg for 3 minutes to drain the veins with a foam wedge was placed at foot for elevation of leg.     Aseptic technique was used along with the 's recommendation for product handling, a total of 5cc medial and 8 ml lateral was administered over a 2 access point with 2 administrations allowing the varithena to flow antegrade and retrograde with digit compression guidance.  While the drug was administered, we compressed the distal inflow vein and the perforators - the foam was observed as it travelled up the desired veins to be treated. During this time the ankle was also flexed to help compress the perforators.     ECHOSCLEROTHERAPY: YES  MAX VEIN SIZE TREATED: 3.5mm    Physical Exam  Musculoskeletal:        Legs:      After 2 minutes of compression, the ankle was then pumped for 30 pumps to help the foam travel cephalad. After treatment was completed, the access cannula/needle was removed and compression was held. We checked for any DVT or foam along the treated vein path and this was negative. Steri strip was applied to the access site(s) and then kerlix/ and compression pads/dressing was applied to the leg.     No reactions occurred during the administration nor while remaining in the clinic for about 10 minutes.   Wrap will stay on for 48 hours. patient  will avoid heavy exercises for 7 days and wear compression stockings on the treated leg for 2 weeks, avoid inactivity and do daily walking.   Follow up will be in 7 days with a repeat duplex in our vascular lab.    STAFF: HARTH, GARCIA, PO, GORCZYNSKI  START: 1020  STOP: 1046    Notable Findings: good coverage of the left leg - may need some visual sclero vs echosclero on the distal lateral calf at later date - will reassess; will do the right calf at next visit                  4/29/2025: Right GSV and AGSV RFA for recurrence      Elyse Wise MD, MHS, RPVI  , [facility] [employer]  Director, Center for Comprehensive Venous Care, [facility] [place]  Co-Director, Vascular Laboratories, [facility] [place]  Division of Vascular Surgery and Endovascular Therapy  [facility]

## 2025-05-19 ENCOUNTER — HOSPITAL ENCOUNTER (OUTPATIENT)
Dept: VASCULAR MEDICINE | Facility: CLINIC | Age: 75
Discharge: HOME | End: 2025-05-19
Payer: MEDICARE

## 2025-05-19 DIAGNOSIS — R60.0 LOCALIZED EDEMA: ICD-10-CM

## 2025-05-19 DIAGNOSIS — I83.893 VARICOSE VEINS OF BILATERAL LOWER EXTREMITIES WITH OTHER COMPLICATIONS: ICD-10-CM

## 2025-05-19 PROCEDURE — 93971 EXTREMITY STUDY: CPT

## 2025-05-19 PROCEDURE — 93971 EXTREMITY STUDY: CPT | Performed by: STUDENT IN AN ORGANIZED HEALTH CARE EDUCATION/TRAINING PROGRAM

## 2025-05-20 LAB
ALBUMIN SERPL-MCNC: 4.1 G/DL (ref 3.6–5.1)
ALP SERPL-CCNC: 57 U/L (ref 37–153)
ALT SERPL-CCNC: 16 U/L (ref 6–29)
ANION GAP SERPL CALCULATED.4IONS-SCNC: 7 MMOL/L (CALC) (ref 7–17)
APPEARANCE UR: CLEAR
AST SERPL-CCNC: 16 U/L (ref 10–35)
BACTERIA #/AREA URNS HPF: ABNORMAL /HPF
BACTERIA UR CULT: ABNORMAL
BILIRUB SERPL-MCNC: 0.6 MG/DL (ref 0.2–1.2)
BILIRUB UR QL STRIP: NEGATIVE
BUN SERPL-MCNC: 17 MG/DL (ref 7–25)
CALCIUM SERPL-MCNC: 9.4 MG/DL (ref 8.6–10.4)
CHLORIDE SERPL-SCNC: 107 MMOL/L (ref 98–110)
CHOLEST SERPL-MCNC: 170 MG/DL
CHOLEST/HDLC SERPL: 2.3 (CALC)
CO2 SERPL-SCNC: 28 MMOL/L (ref 20–32)
COLOR UR: YELLOW
CREAT SERPL-MCNC: 0.89 MG/DL (ref 0.6–1)
EGFRCR SERPLBLD CKD-EPI 2021: 68 ML/MIN/1.73M2
ERYTHROCYTE [DISTWIDTH] IN BLOOD BY AUTOMATED COUNT: 13.3 % (ref 11–15)
GLUCOSE SERPL-MCNC: 86 MG/DL (ref 65–99)
GLUCOSE UR QL STRIP: NEGATIVE
HCT VFR BLD AUTO: 37.1 % (ref 35–45)
HDLC SERPL-MCNC: 73 MG/DL
HGB BLD-MCNC: 11.5 G/DL (ref 11.7–15.5)
HGB UR QL STRIP: NEGATIVE
HYALINE CASTS #/AREA URNS LPF: ABNORMAL /LPF
KETONES UR QL STRIP: NEGATIVE
LDLC SERPL CALC-MCNC: 80 MG/DL (CALC)
LEUKOCYTE ESTERASE UR QL STRIP: NEGATIVE
MCH RBC QN AUTO: 28.6 PG (ref 27–33)
MCHC RBC AUTO-ENTMCNC: 31 G/DL (ref 32–36)
MCV RBC AUTO: 92.3 FL (ref 80–100)
NITRITE UR QL STRIP: NEGATIVE
NONHDLC SERPL-MCNC: 97 MG/DL (CALC)
PH UR STRIP: ABNORMAL [PH] (ref 5–8)
PLATELET # BLD AUTO: 233 THOUSAND/UL (ref 140–400)
PMV BLD REES-ECKER: 10.3 FL (ref 7.5–12.5)
POTASSIUM SERPL-SCNC: 4.4 MMOL/L (ref 3.5–5.3)
PROT SERPL-MCNC: 6.2 G/DL (ref 6.1–8.1)
PROT UR QL STRIP: NEGATIVE
RBC # BLD AUTO: 4.02 MILLION/UL (ref 3.8–5.1)
RBC #/AREA URNS HPF: ABNORMAL /HPF
SERVICE CMNT-IMP: ABNORMAL
SODIUM SERPL-SCNC: 142 MMOL/L (ref 135–146)
SP GR UR STRIP: 1.02 (ref 1–1.03)
SQUAMOUS #/AREA URNS HPF: ABNORMAL /HPF
TRIGL SERPL-MCNC: 87 MG/DL
WBC # BLD AUTO: 4.9 THOUSAND/UL (ref 3.8–10.8)
WBC #/AREA URNS HPF: ABNORMAL /HPF

## 2025-05-23 ENCOUNTER — APPOINTMENT (OUTPATIENT)
Dept: PRIMARY CARE | Facility: CLINIC | Age: 75
End: 2025-05-23
Payer: MEDICARE

## 2025-05-23 VITALS
SYSTOLIC BLOOD PRESSURE: 118 MMHG | HEART RATE: 64 BPM | HEIGHT: 66 IN | DIASTOLIC BLOOD PRESSURE: 74 MMHG | RESPIRATION RATE: 12 BRPM | BODY MASS INDEX: 29.41 KG/M2 | WEIGHT: 183 LBS

## 2025-05-23 DIAGNOSIS — M54.31 SCIATICA OF RIGHT SIDE: ICD-10-CM

## 2025-05-23 DIAGNOSIS — K44.9 HIATAL HERNIA: ICD-10-CM

## 2025-05-23 DIAGNOSIS — I51.9 DIASTOLIC DYSFUNCTION, LEFT VENTRICLE: Primary | ICD-10-CM

## 2025-05-23 DIAGNOSIS — I82.4Y9 DEEP VEIN THROMBOSIS (DVT) OF PROXIMAL LOWER EXTREMITY, UNSPECIFIED CHRONICITY, UNSPECIFIED LATERALITY: ICD-10-CM

## 2025-05-23 DIAGNOSIS — F33.41 RECURRENT MAJOR DEPRESSIVE DISORDER, IN PARTIAL REMISSION: ICD-10-CM

## 2025-05-23 DIAGNOSIS — E78.5 DYSLIPIDEMIA: ICD-10-CM

## 2025-05-23 DIAGNOSIS — E78.5 HYPERLIPIDEMIA, UNSPECIFIED: ICD-10-CM

## 2025-05-23 DIAGNOSIS — G89.29 CHRONIC BILATERAL LOW BACK PAIN WITH RIGHT-SIDED SCIATICA: ICD-10-CM

## 2025-05-23 DIAGNOSIS — N32.81 OAB (OVERACTIVE BLADDER): ICD-10-CM

## 2025-05-23 DIAGNOSIS — K22.719 BARRETT'S ESOPHAGUS WITH DYSPLASIA: ICD-10-CM

## 2025-05-23 DIAGNOSIS — M54.41 CHRONIC BILATERAL LOW BACK PAIN WITH RIGHT-SIDED SCIATICA: ICD-10-CM

## 2025-05-23 DIAGNOSIS — I83.10 VARICOSE VEINS WITH INFLAMMATION: ICD-10-CM

## 2025-05-23 DIAGNOSIS — F51.01 PRIMARY INSOMNIA: ICD-10-CM

## 2025-05-23 DIAGNOSIS — K21.9 GASTRO-ESOPHAGEAL REFLUX DISEASE WITHOUT ESOPHAGITIS: ICD-10-CM

## 2025-05-23 DIAGNOSIS — I27.82 OTHER CHRONIC PULMONARY EMBOLISM, UNSPECIFIED WHETHER ACUTE COR PULMONALE PRESENT (MULTI): ICD-10-CM

## 2025-05-23 PROBLEM — E66.9 OBESITY (BMI 30-39.9): Status: RESOLVED | Noted: 2023-05-16 | Resolved: 2025-05-23

## 2025-05-23 PROBLEM — J31.0 RHINITIS: Status: RESOLVED | Noted: 2023-05-16 | Resolved: 2025-05-23

## 2025-05-23 PROBLEM — E66.09 CLASS 1 OBESITY DUE TO EXCESS CALORIES WITHOUT SERIOUS COMORBIDITY WITH BODY MASS INDEX (BMI) OF 33.0 TO 33.9 IN ADULT: Status: RESOLVED | Noted: 2023-10-30 | Resolved: 2025-05-23

## 2025-05-23 PROBLEM — K12.2 UVULITIS: Status: RESOLVED | Noted: 2023-05-16 | Resolved: 2025-05-23

## 2025-05-23 PROBLEM — J01.10 ACUTE NON-RECURRENT FRONTAL SINUSITIS: Status: RESOLVED | Noted: 2024-01-04 | Resolved: 2025-05-23

## 2025-05-23 PROBLEM — E66.811 CLASS 1 OBESITY DUE TO EXCESS CALORIES WITHOUT SERIOUS COMORBIDITY WITH BODY MASS INDEX (BMI) OF 33.0 TO 33.9 IN ADULT: Status: RESOLVED | Noted: 2023-10-30 | Resolved: 2025-05-23

## 2025-05-23 PROBLEM — E66.09 NON MORBID OBESITY DUE TO EXCESS CALORIES: Status: RESOLVED | Noted: 2023-05-16 | Resolved: 2025-05-23

## 2025-05-23 PROBLEM — E66.09 CLASS 1 OBESITY DUE TO EXCESS CALORIES WITHOUT SERIOUS COMORBIDITY WITH BODY MASS INDEX (BMI) OF 32.0 TO 32.9 IN ADULT: Status: RESOLVED | Noted: 2023-11-14 | Resolved: 2025-05-23

## 2025-05-23 PROBLEM — R55 SYNCOPE: Status: RESOLVED | Noted: 2024-03-14 | Resolved: 2025-05-23

## 2025-05-23 PROBLEM — E66.811 CLASS 1 OBESITY DUE TO EXCESS CALORIES WITHOUT SERIOUS COMORBIDITY WITH BODY MASS INDEX (BMI) OF 32.0 TO 32.9 IN ADULT: Status: RESOLVED | Noted: 2023-11-14 | Resolved: 2025-05-23

## 2025-05-23 PROCEDURE — 1036F TOBACCO NON-USER: CPT | Performed by: FAMILY MEDICINE

## 2025-05-23 PROCEDURE — 99214 OFFICE O/P EST MOD 30 MIN: CPT | Performed by: FAMILY MEDICINE

## 2025-05-23 PROCEDURE — 1159F MED LIST DOCD IN RCRD: CPT | Performed by: FAMILY MEDICINE

## 2025-05-23 PROCEDURE — G2211 COMPLEX E/M VISIT ADD ON: HCPCS | Performed by: FAMILY MEDICINE

## 2025-05-23 RX ORDER — BUPROPION HYDROCHLORIDE 150 MG/1
150 TABLET ORAL DAILY
Qty: 100 TABLET | Refills: 1 | Status: SHIPPED | OUTPATIENT
Start: 2025-05-23

## 2025-05-23 RX ORDER — TRAZODONE HYDROCHLORIDE 100 MG/1
100 TABLET ORAL NIGHTLY PRN
Qty: 100 TABLET | Refills: 1 | Status: SHIPPED | OUTPATIENT
Start: 2025-05-23

## 2025-05-23 RX ORDER — PANTOPRAZOLE SODIUM 40 MG/1
40 TABLET, DELAYED RELEASE ORAL DAILY PRN
Qty: 100 TABLET | Refills: 1 | Status: SHIPPED | OUTPATIENT
Start: 2025-05-23

## 2025-05-23 RX ORDER — EZETIMIBE 10 MG/1
10 TABLET ORAL DAILY
Qty: 100 TABLET | Refills: 1 | Status: SHIPPED | OUTPATIENT
Start: 2025-05-23

## 2025-05-23 RX ORDER — OXYBUTYNIN CHLORIDE 10 MG/1
10 TABLET, EXTENDED RELEASE ORAL DAILY
Qty: 100 TABLET | Refills: 1 | Status: SHIPPED | OUTPATIENT
Start: 2025-05-23

## 2025-05-23 RX ORDER — SIMVASTATIN 40 MG/1
40 TABLET, FILM COATED ORAL NIGHTLY
Qty: 100 TABLET | Refills: 1 | Status: SHIPPED | OUTPATIENT
Start: 2025-05-23

## 2025-05-23 ASSESSMENT — ENCOUNTER SYMPTOMS
ADENOPATHY: 0
SLEEP DISTURBANCE: 0
CHILLS: 0
FATIGUE: 0
APPETITE CHANGE: 0
BACK PAIN: 1
ABDOMINAL PAIN: 0
CHEST TIGHTNESS: 0
SINUS PRESSURE: 0
BRUISES/BLEEDS EASILY: 0
NERVOUS/ANXIOUS: 0
DYSPHORIC MOOD: 1
DYSURIA: 0
DIARRHEA: 0
SHORTNESS OF BREATH: 0
NAUSEA: 0
MYALGIAS: 0
ABDOMINAL DISTENTION: 0
WHEEZING: 0
LIGHT-HEADEDNESS: 0
DIFFICULTY URINATING: 0
FEVER: 0
ARTHRALGIAS: 1
HEADACHES: 0

## 2025-05-23 NOTE — PROGRESS NOTES
"Subjective   Patient ID: Zara Motta is a 74 y.o. female who presents for Follow-up.    HPI     Review of Systems    Objective   /74   Pulse 64   Resp 12   Ht 1.676 m (5' 6\")   Wt 83 kg (183 lb)   LMP  (LMP Unknown)   BMI 29.54 kg/m²     Physical Exam    Assessment/Plan          "

## 2025-05-23 NOTE — PROGRESS NOTES
Subjective   Patient ID: Zara Motta is a 74 y.o. female who presents for Follow-up.  Pt has stable Diastolinc LV dysfunction, Dyslipidemia.   Lipid panel showed LDL 80.  Currently taking Simvastatin and Zetia and is tolerating well without muscle pains or weakness.     For hx DVT doing well on Eliquis without bleeding issues.     For OAB  taking Oxybutynin . Doing well and tolerating medication well. Frequency is not bothersome.     GERD is well controlled on Pantoprazole . Pt is taking medication daily . Denies epigastric pain, nausea, heartburn or water brash.    Pt has chronic and stable depression   Pt  has supportive family/friends.   Pt not seeing a counselor.   Taking Trazodone, Wellbutrin and it is helping.   Mood, energy, motivation, interests, sleep and appetite are adequate. Anxiety is stable.  Pt denies suicidal ideations.     For chronic back, seeing Dr Jackson at [facility]. Tried test injx without relief.     For venous reflux she is doing tx with vascular surgery. She is using compression hose.         Review of Systems   Constitutional:  Negative for appetite change, chills, fatigue and fever.   HENT:  Negative for congestion, ear pain and sinus pressure.    Eyes:  Negative for visual disturbance.   Respiratory:  Negative for chest tightness, shortness of breath and wheezing.    Cardiovascular:  Negative for chest pain.   Gastrointestinal:  Negative for abdominal distention, abdominal pain, diarrhea and nausea.   Genitourinary:  Negative for difficulty urinating, dysuria and pelvic pain.   Musculoskeletal:  Positive for arthralgias and back pain. Negative for myalgias.   Skin:  Negative for rash.   Allergic/Immunologic: Negative for immunocompromised state.   Neurological:  Negative for light-headedness and headaches.   Hematological:  Negative for adenopathy. Does not bruise/bleed easily.   Psychiatric/Behavioral:  Positive for dysphoric mood. Negative for sleep disturbance and suicidal ideas. The  "patient is not nervous/anxious.        Objective   /74   Pulse 64   Resp 12   Ht 1.676 m (5' 6\")   Wt 83 kg (183 lb)   LMP  (LMP Unknown)   BMI 29.54 kg/m²    Physical Exam  Constitutional:       General: She is not in acute distress.     Appearance: Normal appearance.   Cardiovascular:      Rate and Rhythm: Normal rate and regular rhythm.      Heart sounds: Normal heart sounds. No murmur heard.  Pulmonary:      Effort: Pulmonary effort is normal.      Breath sounds: Normal breath sounds.   Abdominal:      Palpations: Abdomen is soft.      Tenderness: There is no abdominal tenderness.   Musculoskeletal:      Right lower le+ Pitting Edema present.      Left lower le+ Pitting Edema present.   Neurological:      Mental Status: She is alert.   Psychiatric:         Mood and Affect: Mood normal.         Judgment: Judgment normal.           Assessment/Plan   Diagnoses and all orders for this visit:  Diastolic dysfunction, left ventricle - asymptomatic and BP controlled, monitor  Hyperlipidemia - stable on Simvastatin and Zetia, continue  OAB (overactive bladder) - well controlled with Oxybutynin, continue  Varicose veins with inflammation - still bothersome, continue treatments with vascular  Deep vein thrombosis (DVT)/Pulmonary embolism - doing well on Eliquis, continue  Gastro-esophageal reflux disease /Machuca's esophagus with dysplasia/Hiatal hernia - stable on Protonix, EGD coming due with GI  Sciatica of right side/Chronic bilateral low back pain with right-sided sciatica - not well controlled, continue Tylenol and keep follow up with [facility] pain mngt  Recurrent major depressive disorder/Primary insomnia - stable on Bupropion and Trazodone, continue and monitor.     Follow up in 6 months, 30mins     "

## 2025-05-27 ENCOUNTER — PROCEDURE VISIT (OUTPATIENT)
Dept: VASCULAR SURGERY | Facility: CLINIC | Age: 75
End: 2025-05-27
Payer: MEDICARE

## 2025-05-27 VITALS
BODY MASS INDEX: 29.21 KG/M2 | HEART RATE: 68 BPM | RESPIRATION RATE: 16 BRPM | SYSTOLIC BLOOD PRESSURE: 140 MMHG | DIASTOLIC BLOOD PRESSURE: 78 MMHG | WEIGHT: 181 LBS

## 2025-05-27 DIAGNOSIS — F33.41 RECURRENT MAJOR DEPRESSIVE DISORDER, IN PARTIAL REMISSION: ICD-10-CM

## 2025-05-27 DIAGNOSIS — N32.81 OAB (OVERACTIVE BLADDER): ICD-10-CM

## 2025-05-27 DIAGNOSIS — E78.5 HYPERLIPIDEMIA, UNSPECIFIED: ICD-10-CM

## 2025-05-27 DIAGNOSIS — I87.2 EDEMA OF RIGHT LOWER EXTREMITY DUE TO PERIPHERAL VENOUS INSUFFICIENCY: Primary | ICD-10-CM

## 2025-05-27 PROCEDURE — 36465 NJX NONCMPND SCLRSNT 1 VEIN: CPT | Performed by: SURGERY

## 2025-05-27 NOTE — PROGRESS NOTES
Patient was seen today for administration of VARITHENA to right leg at the level of  calf.     The patient was given an explanation of the protocol for administering Varithena risks and benefits were explained to the patient. After consent was obtained, the patient was positioned on the exam table in reverse trendelenberg and the right,   leg was prepped and draped (after tributaries and perforators were identified and marked). The veins requiring treatment were identified and measured/marked. The vein was cannulated using US guidance and venous access was confirmed. Once access was obtained, the patient was placed in steep trendelenberg for 3 minutes to drain the veins with a foam wedge was placed at foot for elevation of leg.     Aseptic technique was used along with the 's recommendation for product handling, a total of 6 cc was administered over a 1 access point with 1 administrations allowing the varithena to flow antegrade and retrograde with digit compression guidance.  While the drug was administered, we compressed the distal inflow vein and the perforators - the foam was observed as it travelled up the desired veins to be treated. During this time the ankle was also flexed to help compress the perforators.     ECHOSCLEROTHERAPY: YES  MAX VEIN SIZE TREATED: 3.5mm    Physical Exam  Musculoskeletal:        Legs:        After 2 minutes of compression, the ankle was then pumped for 30 pumps to help the foam travel cephalad. After treatment was completed, the access cannula/needle was removed and compression was held. We checked for any DVT or foam along the treated vein path and this was negative. Steri strip was applied to the access site(s) and then kerlix/ and compression pads/dressing was applied to the leg.     No reactions occurred during the administration nor while remaining in the clinic for about 10 minutes.   Wrap will stay on for 48 hours. patient will avoid heavy exercises for 7  days and wear compression stockings on the treated leg for 2 weeks, avoid inactivity and do daily walking.   Follow up will be in 7 days with a repeat duplex in our vascular lab.    STAFF: PRISCA WISE  START: 1140  STOP: 1153    Notable Findings: Good distribution of varithena on the right leg.   Performed MICROTHROMBECTOMY OF THE LEFT LEG with good tolerance and yield.         Elyse Wise MD, MHS, RPVI  , [facility] [employer]  Director, Center for Comprehensive Venous Care, [facility] [place]  Co-Director, Vascular Laboratories, [facility] [place]  Division of Vascular Surgery and Endovascular Therapy  [facility]

## 2025-06-02 ENCOUNTER — HOSPITAL ENCOUNTER (OUTPATIENT)
Dept: VASCULAR MEDICINE | Facility: CLINIC | Age: 75
Discharge: HOME | End: 2025-06-02
Payer: MEDICARE

## 2025-06-02 DIAGNOSIS — I83.893 VARICOSE VEINS OF BILATERAL LOWER EXTREMITIES WITH OTHER COMPLICATIONS: ICD-10-CM

## 2025-06-02 PROCEDURE — 93971 EXTREMITY STUDY: CPT | Performed by: SURGERY

## 2025-06-02 PROCEDURE — 93971 EXTREMITY STUDY: CPT

## 2025-06-11 ENCOUNTER — APPOINTMENT (OUTPATIENT)
Dept: VASCULAR SURGERY | Facility: CLINIC | Age: 75
End: 2025-06-11
Payer: MEDICARE

## 2025-06-18 ENCOUNTER — OFFICE VISIT (OUTPATIENT)
Dept: CARDIOLOGY | Facility: CLINIC | Age: 75
End: 2025-06-18
Payer: MEDICARE

## 2025-06-18 VITALS
DIASTOLIC BLOOD PRESSURE: 68 MMHG | SYSTOLIC BLOOD PRESSURE: 101 MMHG | RESPIRATION RATE: 16 BRPM | HEART RATE: 74 BPM | BODY MASS INDEX: 29.09 KG/M2 | WEIGHT: 181 LBS | HEIGHT: 66 IN

## 2025-06-18 DIAGNOSIS — I82.403 RECURRENT DEEP VEIN THROMBOSIS (DVT) OF BOTH LOWER EXTREMITIES: Primary | ICD-10-CM

## 2025-06-18 DIAGNOSIS — Z79.01 CHRONIC ANTICOAGULATION: ICD-10-CM

## 2025-06-18 DIAGNOSIS — I87.023 POSTTHROMBOTIC SYNDROME OF BOTH LOWER EXTREMITIES WITH INFLAMMATION: ICD-10-CM

## 2025-06-18 PROCEDURE — 1126F AMNT PAIN NOTED NONE PRSNT: CPT | Performed by: INTERNAL MEDICINE

## 2025-06-18 PROCEDURE — 1036F TOBACCO NON-USER: CPT | Performed by: INTERNAL MEDICINE

## 2025-06-18 PROCEDURE — 1160F RVW MEDS BY RX/DR IN RCRD: CPT | Performed by: INTERNAL MEDICINE

## 2025-06-18 PROCEDURE — 99213 OFFICE O/P EST LOW 20 MIN: CPT

## 2025-06-18 PROCEDURE — 1159F MED LIST DOCD IN RCRD: CPT | Performed by: INTERNAL MEDICINE

## 2025-06-18 PROCEDURE — 99214 OFFICE O/P EST MOD 30 MIN: CPT | Performed by: INTERNAL MEDICINE

## 2025-06-18 PROCEDURE — G2211 COMPLEX E/M VISIT ADD ON: HCPCS | Performed by: INTERNAL MEDICINE

## 2025-06-18 ASSESSMENT — PAIN SCALES - GENERAL: PAINLEVEL_OUTOF10: 0-NO PAIN

## 2025-06-18 NOTE — PROGRESS NOTES
"OUTPATIENT FOLLOW-UP -  VASCULAR MEDICINE    DOS: 6/18/25  Last seen:   8/21/24     REQUESTING PHYSICIAN:  Dr Ha Damian    REASON FOR FOLLOW-UP:  Here for follow up recurrent VTE on longterm AC with eliquis    HISTORY OF PRESENT ILLNESS:     75 yo lady here for follow up recurrent VTE on longterm AC with eliquis - remains on 5 mg q 12 hours. Last VTE 4/2024 after foot and ankle surgery for fx. Does not have a med-alert bracelet. Denies bleeding on the AC. Reports leg swelling - using the compression. Reports being fitted for these at [facility].      Reports saw Dr. Wise - had procedures done bilaterally - does not think this helped.  From review: right GSV RFA; left and right varithena.     From prev notes:  Has h/o recurrent VTE including PE 2000 - this was idiopathic (?OCPs at that time). Rx warfarin x 6 months. Acute DVT RLE 2002 - also idiopathic - was on warfarin and planned for indefinite therapy. Did well on warfarin without bleeding. Was doing home POCT. Had bunny TKA in 2012 and this was complicated by LLE DVT. Warfarin was maintained. Has had bunny MINERVA - left approx 10 years ago. Right MINERVA - 2022 - no VTE but AC was changed at that time to eliquis as noted. Foot and ankle fx 3/2024. Underwent ORIF - AC was not resumed post-op -  did not have SCDs, no lovenox or UFH - reports AC held for approx 6 days. Dx with RLE calf vein DVT approx 2 weeks after surgery - was back on AC at that time. By prev reports - has had thrombophilia testing and this was normal - no records available.     Left foot started swelling 1 year +/-. Since starting - this is worse - relates this to not walking and being NWB right. Reports her \"regular compression will not fit on her feet bc of swelling. Reports uses compression she purchases on [employer] but does not know the grade. These were last used about a month ago before she broke her foot. PT provided light compression from a role - this is helping \"a little bit\" using a single " "layer of compression. Sleeps in bed - foot of the bed is not elevated. Did sleep in a chair for a month - could not get up stairs. No sores or ulcer related to the swelling.      REVIEW OF SYSTEMS:     weight is decreased 30# intentional  No sores, ulcers, rashes, +skin lesions  + edema, no calf pain    PHYSICAL EXAMINATION:   /68 (BP Location: Right arm, Patient Position: Sitting)   Pulse 74   Resp 16   Ht 1.676 m (5' 6\")   Wt 82.1 kg (181 lb)   LMP  (LMP Unknown)   BMI 29.21 kg/m²     Gen: Appears well, NAD  Ext: no edema, nontender  Skin: LDS and hemosiderin staining R>>L   Pulses: DP 2+;   Mood and affect appropriate    ADDITIONAL DATA:   no compression worn   right calf: 41.5cm  left calf: 42.0cm     ASSESSMENT/PLAN:    Here for follow up recurrent VTE on longterm AC with eliquis - discussed decreasing the eliquis to 2.5 mg q 12 hours for ongoing secondary prophylaxis. Please get and use a med alert bracelet. Continue the compression. Use the 20-30 mmHg stocking. Follow up 6 months   "

## 2025-06-18 NOTE — PATIENT INSTRUCTIONS
ASSESSMENT/PLAN:    Here for follow up recurrent VTE on longterm AC with eliquis - discussed decreasing the eliquis to 2.5 mg q 12 hours for ongoing secondary prophylaxis. Please get and use a med alert bracelet. Continue the compression. Use the 20-30 mmHg stocking. Follow up 6 months

## 2025-06-30 ENCOUNTER — APPOINTMENT (OUTPATIENT)
Dept: VASCULAR SURGERY | Facility: CLINIC | Age: 75
End: 2025-06-30
Payer: MEDICARE

## 2025-06-30 VITALS
SYSTOLIC BLOOD PRESSURE: 94 MMHG | BODY MASS INDEX: 29.09 KG/M2 | HEIGHT: 66 IN | HEART RATE: 77 BPM | DIASTOLIC BLOOD PRESSURE: 64 MMHG | WEIGHT: 181 LBS

## 2025-06-30 DIAGNOSIS — R60.0 EDEMA OF BOTH LOWER LEGS DUE TO PERIPHERAL VENOUS INSUFFICIENCY: ICD-10-CM

## 2025-06-30 DIAGNOSIS — I87.023 POSTTHROMBOTIC SYNDROME OF BOTH LOWER EXTREMITIES WITH INFLAMMATION: Primary | ICD-10-CM

## 2025-06-30 DIAGNOSIS — I87.2 EDEMA OF BOTH LOWER LEGS DUE TO PERIPHERAL VENOUS INSUFFICIENCY: ICD-10-CM

## 2025-06-30 PROCEDURE — 1036F TOBACCO NON-USER: CPT | Performed by: NURSE PRACTITIONER

## 2025-06-30 PROCEDURE — 99214 OFFICE O/P EST MOD 30 MIN: CPT | Performed by: NURSE PRACTITIONER

## 2025-06-30 PROCEDURE — 1125F AMNT PAIN NOTED PAIN PRSNT: CPT | Performed by: NURSE PRACTITIONER

## 2025-06-30 ASSESSMENT — ENCOUNTER SYMPTOMS
NEUROLOGICAL NEGATIVE: 1
ALLERGIC/IMMUNOLOGIC NEGATIVE: 1
PALPITATIONS: 0
MUSCULOSKELETAL NEGATIVE: 1
PSYCHIATRIC NEGATIVE: 1
EYES NEGATIVE: 1
ENDOCRINE NEGATIVE: 1
GASTROINTESTINAL NEGATIVE: 1
RESPIRATORY NEGATIVE: 1
CONSTITUTIONAL NEGATIVE: 1
SHORTNESS OF BREATH: 0

## 2025-06-30 ASSESSMENT — COLUMBIA-SUICIDE SEVERITY RATING SCALE - C-SSRS
2. HAVE YOU ACTUALLY HAD ANY THOUGHTS OF KILLING YOURSELF?: NO
1. IN THE PAST MONTH, HAVE YOU WISHED YOU WERE DEAD OR WISHED YOU COULD GO TO SLEEP AND NOT WAKE UP?: NO
6. HAVE YOU EVER DONE ANYTHING, STARTED TO DO ANYTHING, OR PREPARED TO DO ANYTHING TO END YOUR LIFE?: NO

## 2025-06-30 ASSESSMENT — PAIN SCALES - GENERAL: PAINLEVEL_OUTOF10: 5

## 2025-06-30 NOTE — PATIENT INSTRUCTIONS
It was a pleasure taking care of you today and appreciate your seeing us at our [facility] [place] [facility].     Today's plan is as follows:  1) Elevate your legs at rest  2) Wear 20-30mmHg compression stockings, on during the day when standing, off while sleeping  3) Follow-up in 3-4 months, this may be at the [facility] location    Please call the office with any questions at [phone].   You can speak to our secretaries or our clinical nurses for specific questions.   For Vein Center specific questions, you can also call [phone] or email at [email or web address]  If you need coordinating your appointments and testing you can do these at the  or by calling my office shortly after your visit.

## 2025-06-30 NOTE — PROGRESS NOTES
"F/U REASON: CVI and post thrombotic syndrome    HPI:  Zara Motta is 74 y.o. female here for follow up of CVI and post thrombotic syndrome.    The patient reports her legs do continue to ache although she has a hard time determining if it is related to the veins or musculoskeletal pains. She feels her left leg may have a little bit more swelling than her right leg today. She notes more tenderness to the left leg than the right leg were the veins were treated.     RIGHT LEG C1 Telangiectasias or reticular veins, C2 Varicose Veins, C3 Edema, C4a Pigmentation or Eczema, and C4b Lipodermatosclerosis or atrophie ted  RIGHT LEG PAIN 1, VARICOSE VEINS 1, VENOUS EDEMA 1, SKIN PIGMENTATION 1, INDURATION 1, and USE OF COMPRESSION 3  RIGHT LEG CEAP: C4b  RIGHT LEG VCSS SCORE: 8    LEFT LEG C1 Telangiectasias or reticular veins, C2 Varicose Veins, C3 Edema, and C4a Pigmentation or Eczema  LEFT LEG PAIN 1, VARICOSE VEINS 1, VENOUS EDEMA 1, SKIN PIGMENTATION 1, and USE OF COMPRESSION 3  LEFT LEG CEAP: C4a  LEFT LEG VCSS SCORE: 7    Meds:   Current Medications[1]    Allergies:   RX Allergies[2]    ROS:  Review of Systems   Constitutional: Negative.    HENT: Negative.     Eyes: Negative.    Respiratory: Negative.  Negative for shortness of breath.    Cardiovascular:  Positive for leg swelling. Negative for chest pain and palpitations.   Gastrointestinal: Negative.    Endocrine: Negative.    Genitourinary: Negative.    Musculoskeletal: Negative.    Skin: Negative.    Allergic/Immunologic: Negative.    Neurological: Negative.    Psychiatric/Behavioral: Negative.       otherwise unremarkable    Objective:  Vitals:  BP 94/64 (BP Location: Right arm, Patient Position: Sitting, BP Cuff Size: Adult)   Pulse 77   Ht 1.676 m (5' 6\")   Wt 82.1 kg (181 lb)   LMP  (LMP Unknown)   BMI 29.21 kg/m²     Exam:  Physical Exam  Constitutional:       Appearance: Normal appearance.   HENT:      Head: Normocephalic and atraumatic.      " Nose: Nose normal.      Mouth/Throat:      Mouth: Mucous membranes are moist.   Eyes:      Conjunctiva/sclera: Conjunctivae normal.   Cardiovascular:      Rate and Rhythm: Normal rate.      Pulses: Normal pulses.   Pulmonary:      Effort: Pulmonary effort is normal.   Musculoskeletal:         General: Normal range of motion.      Cervical back: Normal range of motion.      Right lower leg: Edema present.      Left lower leg: Edema present.      Comments: Negative homans sign   Skin:     General: Skin is warm and dry.      Capillary Refill: Capillary refill takes less than 2 seconds.      Comments: BLE hyperpigmentation R>L. Small amount of LDS to right shin. Scattered reticular veins and small amount of varicose veins remaining.    Neurological:      General: No focal deficit present.      Mental Status: She is alert and oriented to person, place, and time.   Psychiatric:         Mood and Affect: Mood normal.         Behavior: Behavior normal.         Thought Content: Thought content normal.         Judgment: Judgment normal.       Labs:  Lab Results   Component Value Date    WBC 4.9 05/19/2025    WBC 5.0 11/15/2024    WBC 4.4 06/21/2024    HGB 11.5 (L) 05/19/2025    HGB 12.1 11/15/2024    HGB 11.1 (L) 06/21/2024    HCT 37.1 05/19/2025    HCT 39.0 11/15/2024    HCT 36.0 06/21/2024    MCV 92.3 05/19/2025    MCV 89 11/15/2024    MCV 86 06/21/2024     05/19/2025     Lab Results   Component Value Date    CREATININE 0.89 05/19/2025    CREATININE 0.89 11/12/2024    CREATININE 0.84 05/06/2024    BUN 17 05/19/2025    BUN 15 11/12/2024    BUN 13 05/06/2024     05/19/2025     11/12/2024     05/06/2024    K 4.4 05/19/2025    K 4.0 11/12/2024    K 4.5 05/06/2024     05/19/2025     11/12/2024     05/06/2024    CO2 28 05/19/2025    CO2 30 11/12/2024    CO2 28 05/06/2024       Imaging:                        Assessment & Plan:  Zara Motta is 74 y.o. female with history of NPV for  Post Thrombotic syndrome.   recurrent VTE with PE starting in 2000 on long term AC with eliquis   reports prev vein procedure RLE with Dr. Davidson (20 years ago). Prior to that had sclerotherapy with Dr. Scherer     In Feb 2025 she initially presented with ache/heaviness/increase varicosities  Swelling over the day; about the same - despite compression sock use  No edema in the am  +throbbing/burning - over the day  No prior ulcers     2/2025  RIGHT LEG C4b Lipodermatosclerosis or atrophie ted  RIGHT LEG VCSS SCORE: 9  LEFT LEG C3 Edema  LEFT LEG VCSS SCORE: 8    4/29/25: R GSV RFA   5/13/25: L medial and lateral calf Varithena  5/27/25: R tributary Varithena  6/30/25: Patient presented for MICROTHROMBECTOMY  leg drained with 18 gauge needle: left side  prep agent: ETOH  locations drained: left shin/medial calf  at completion of procedure, steri strips were applied and the patient's compression dressing.     RIGHT LEG CEAP: C4b  RIGHT LEG VCSS SCORE: 8  LEFT LEG CEAP: C4a  LEFT LEG VCSS SCORE: 7    Reviewed recent venous duplex imaging.     It was a pleasure taking care of you today and appreciate your seeing us at our [facility] [place] [facility].     Today's plan is as follows:  1) Elevate your legs at rest  2) Wear 20-30mmHg compression stockings, on during the day when standing, off while sleeping  3) Follow-up in 3-4 months, this may be at the [facility] location    >45mins spent with patient during exam, ros, discussion, plan of care, and microthrombectomy      Sylvie Bond, LIAN, APRN-CNP, AGPCNP-C, FNP-C, AGACNP-BC  Senior Nurse Practitioner, Vascular Surgery  [facility], [facility] [place]  [address], Office [phone]             [1]   Current Outpatient Medications:     acetaminophen (Tylenol) 325 mg tablet, TAKE THREE  TABLETS EVERY 8 HOURS AS NEEDED FOR PAIN, Disp: , Rfl:     [START ON 9/5/2025] apixaban (Eliquis) 2.5 mg tablet, Take 1 tablet (2.5 mg) by mouth 2 times a day. Do not fill before September 5, 2025., Disp: 180 tablet, Rfl: 3    buPROPion XL (Wellbutrin XL) 150 mg 24 hr tablet, Take 1 tablet (150 mg) by mouth once daily. as directed, Disp: 100 tablet, Rfl: 1    ezetimibe (Zetia) 10 mg tablet, Take 1 tablet (10 mg) by mouth once daily., Disp: 100 tablet, Rfl: 1    oxyBUTYnin XL (Ditropan-XL) 10 mg 24 hr tablet, Take 1 tablet (10 mg) by mouth once daily., Disp: 100 tablet, Rfl: 1    pantoprazole (ProtoNix) 40 mg EC tablet, Take 1 tablet (40 mg) by mouth once daily as needed (gerd). Do not crush, chew, or split. (Patient taking differently: Take 1 tablet (40 mg) by mouth once daily in the morning. Take before meals. Do not crush, chew, or split.), Disp: 100 tablet, Rfl: 1    simvastatin (Zocor) 40 mg tablet, Take 1 tablet (40 mg) by mouth once daily at bedtime., Disp: 100 tablet, Rfl: 1    traZODone (Desyrel) 100 mg tablet, Take 1 tablet (100 mg) by mouth as needed at bedtime for sleep., Disp: 100 tablet, Rfl: 1  [2]   Allergies  Allergen Reactions    Estrogens Unknown

## 2025-12-01 ENCOUNTER — APPOINTMENT (OUTPATIENT)
Dept: PRIMARY CARE | Facility: CLINIC | Age: 75
End: 2025-12-01
Payer: MEDICARE

## 2026-02-18 ENCOUNTER — APPOINTMENT (OUTPATIENT)
Dept: OPHTHALMOLOGY | Age: 76
End: 2026-02-18
Payer: MEDICARE

## 2026-03-02 ENCOUNTER — APPOINTMENT (OUTPATIENT)
Dept: PRIMARY CARE | Facility: CLINIC | Age: 76
End: 2026-03-02
Payer: MEDICARE